# Patient Record
Sex: FEMALE | Race: WHITE | NOT HISPANIC OR LATINO | Employment: UNEMPLOYED | URBAN - METROPOLITAN AREA
[De-identification: names, ages, dates, MRNs, and addresses within clinical notes are randomized per-mention and may not be internally consistent; named-entity substitution may affect disease eponyms.]

---

## 2017-12-14 ENCOUNTER — HISTORICAL (OUTPATIENT)
Dept: RADIOLOGY | Facility: HOSPITAL | Age: 42
End: 2017-12-14

## 2019-02-12 ENCOUNTER — HISTORICAL (OUTPATIENT)
Dept: RADIOLOGY | Facility: HOSPITAL | Age: 44
End: 2019-02-12

## 2021-04-07 ENCOUNTER — HISTORICAL (OUTPATIENT)
Dept: RADIOLOGY | Facility: HOSPITAL | Age: 46
End: 2021-04-07

## 2021-05-25 ENCOUNTER — HISTORICAL (OUTPATIENT)
Dept: RADIOLOGY | Facility: HOSPITAL | Age: 46
End: 2021-05-25

## 2021-05-26 LAB
ABS NEUT (OLG): 6.92 X10(3)/MCL (ref 2.1–9.2)
B-HCG SERPL QL: NEGATIVE
BASOPHILS # BLD AUTO: 0 X10(3)/MCL (ref 0–0.2)
BASOPHILS NFR BLD AUTO: 0 %
BUN SERPL-MCNC: 10.7 MG/DL (ref 7–18.7)
CALCIUM SERPL-MCNC: 10.3 MG/DL (ref 8.4–10.2)
CHLORIDE SERPL-SCNC: 104 MMOL/L (ref 98–107)
CO2 SERPL-SCNC: 23 MMOL/L (ref 22–29)
CREAT SERPL-MCNC: 0.71 MG/DL (ref 0.55–1.02)
CREAT/UREA NIT SERPL: 15
EOSINOPHIL # BLD AUTO: 0.2 X10(3)/MCL (ref 0–0.9)
EOSINOPHIL NFR BLD AUTO: 2 %
ERYTHROCYTE [DISTWIDTH] IN BLOOD BY AUTOMATED COUNT: 12.3 % (ref 11.5–17)
GLUCOSE SERPL-MCNC: 88 MG/DL (ref 74–100)
HCT VFR BLD AUTO: 43.4 % (ref 37–47)
HGB BLD-MCNC: 14.7 GM/DL (ref 12–16)
IMM GRANULOCYTES # BLD AUTO: 0.02 % (ref 0–0.02)
IMM GRANULOCYTES NFR BLD AUTO: 0.2 % (ref 0–0.43)
LYMPHOCYTES # BLD AUTO: 2.9 X10(3)/MCL (ref 0.6–4.6)
LYMPHOCYTES NFR BLD AUTO: 28 %
MCH RBC QN AUTO: 31 PG (ref 27–31)
MCHC RBC AUTO-ENTMCNC: 33.9 GM/DL (ref 33–36)
MCV RBC AUTO: 91.6 FL (ref 80–94)
MONOCYTES # BLD AUTO: 0.6 X10(3)/MCL (ref 0.1–1.3)
MONOCYTES NFR BLD AUTO: 5 %
NEUTROPHILS # BLD AUTO: 6.92 X10(3)/MCL (ref 1.4–7.9)
NEUTROPHILS NFR BLD AUTO: 65 %
PLATELET # BLD AUTO: 386 X10(3)/MCL (ref 130–400)
PMV BLD AUTO: 9.3 FL (ref 9.4–12.4)
POTASSIUM SERPL-SCNC: 4.5 MMOL/L (ref 3.5–5.1)
RBC # BLD AUTO: 4.74 X10(6)/MCL (ref 4.2–5.4)
SARS-COV-2 AG RESP QL IA.RAPID: NEGATIVE
SODIUM SERPL-SCNC: 139 MMOL/L (ref 136–145)
WBC # SPEC AUTO: 10.6 X10(3)/MCL (ref 4.5–11.5)

## 2021-05-28 ENCOUNTER — HISTORICAL (OUTPATIENT)
Dept: SURGERY | Facility: HOSPITAL | Age: 46
End: 2021-05-28

## 2021-09-03 LAB
ABS NEUT (OLG): 4.88 X10(3)/MCL (ref 2.1–9.2)
BASOPHILS # BLD AUTO: 0.05 X10(3)/MCL (ref 0–0.2)
BASOPHILS NFR BLD AUTO: 0.6 % (ref 0–1)
BUN SERPL-MCNC: 12.2 MG/DL (ref 7–18.7)
CALCIUM SERPL-MCNC: 10.9 MG/DL (ref 8.4–10.2)
CHLORIDE SERPL-SCNC: 106 MMOL/L (ref 98–107)
CO2 SERPL-SCNC: 27 MMOL/L (ref 22–29)
CREAT SERPL-MCNC: 0.85 MG/DL (ref 0.57–1.11)
CREAT/UREA NIT SERPL: 14
EOSINOPHIL # BLD AUTO: 0.15 X10(3)/MCL (ref 0–0.9)
EOSINOPHIL NFR BLD AUTO: 1.8 % (ref 0–6.4)
ERYTHROCYTE [DISTWIDTH] IN BLOOD BY AUTOMATED COUNT: 12.4 % (ref 11.5–17)
GLUCOSE SERPL-MCNC: 96 MG/DL (ref 74–100)
HCT VFR BLD AUTO: 42.4 % (ref 37–47)
HGB BLD-MCNC: 14.2 GM/DL (ref 12–16)
IMM GRANULOCYTES # BLD AUTO: 0.03 10*3/UL (ref 0–0.02)
IMM GRANULOCYTES NFR BLD AUTO: 0.4 % (ref 0–0.43)
LYMPHOCYTES # BLD AUTO: 2.54 X10(3)/MCL (ref 0.6–4.6)
LYMPHOCYTES NFR BLD AUTO: 31.1 % (ref 16–44)
MCH RBC QN AUTO: 31.1 PG (ref 27–31)
MCHC RBC AUTO-ENTMCNC: 33.5 GM/DL (ref 33–36)
MCV RBC AUTO: 93 FL (ref 80–94)
MONOCYTES # BLD AUTO: 0.51 X10(3)/MCL (ref 0.1–1.3)
MONOCYTES NFR BLD AUTO: 6.3 % (ref 4–12.1)
NEUTROPHILS # BLD AUTO: 4.88 X10(3)/MCL (ref 2.1–9.2)
NEUTROPHILS NFR BLD AUTO: 59.8 % (ref 43–73)
NRBC BLD AUTO-RTO: 0 % (ref 0–0.2)
PLATELET # BLD AUTO: 374 X10(3)/MCL (ref 130–400)
PMV BLD AUTO: 9 FL (ref 7.4–10.4)
POTASSIUM SERPL-SCNC: 4.6 MMOL/L (ref 3.5–5.1)
RBC # BLD AUTO: 4.56 X10(6)/MCL (ref 4.2–5.4)
SARS-COV-2 AG RESP QL IA.RAPID: NEGATIVE
SODIUM SERPL-SCNC: 142 MMOL/L (ref 136–145)
WBC # SPEC AUTO: 8.2 X10(3)/MCL (ref 4.5–11.5)

## 2021-09-07 ENCOUNTER — HISTORICAL (OUTPATIENT)
Dept: SURGERY | Facility: HOSPITAL | Age: 46
End: 2021-09-07

## 2022-02-22 LAB
PAP RECOMMENDATION EXT: NORMAL
PAP SMEAR: NORMAL

## 2022-03-23 ENCOUNTER — HISTORICAL (OUTPATIENT)
Dept: LAB | Facility: HOSPITAL | Age: 47
End: 2022-03-23

## 2022-03-23 LAB
FSH SERPL-ACNC: 5.71 M[IU]/ML
TESTOST SERPL-MCNC: 34.66 NG/DL (ref 13.84–53.35)
TSH SERPL-ACNC: 1.5 M[IU]/L (ref 0.35–4.94)

## 2022-03-28 ENCOUNTER — HISTORICAL (OUTPATIENT)
Dept: ADMINISTRATIVE | Facility: HOSPITAL | Age: 47
End: 2022-03-28

## 2022-03-28 ENCOUNTER — HISTORICAL (OUTPATIENT)
Dept: RADIOLOGY | Facility: HOSPITAL | Age: 47
End: 2022-03-28

## 2022-04-10 ENCOUNTER — HISTORICAL (OUTPATIENT)
Dept: ADMINISTRATIVE | Facility: HOSPITAL | Age: 47
End: 2022-04-10
Payer: COMMERCIAL

## 2022-04-24 VITALS
DIASTOLIC BLOOD PRESSURE: 79 MMHG | HEIGHT: 66 IN | WEIGHT: 215 LBS | BODY MASS INDEX: 34.55 KG/M2 | SYSTOLIC BLOOD PRESSURE: 139 MMHG

## 2022-04-30 NOTE — OP NOTE
DATE OF SURGERY:    05/28/2021    SURGEON:  Jace Esquivel MD    SERVICE:  Orthopedics.    PREOPERATIVE DIAGNOSES:    1. Left knee partial medial collateral ligament tear.   2. Osteochondral defect with loose cartilage.   3. Bucket handle tear, medial meniscus.    4. Left knee partial anterior cruciate ligament tear.    POSTOPERATIVE DIAGNOSES:    1. Left knee partial medial collateral ligament tear.   2. Osteochondral defect with loose cartilage.   3. Bucket handle tear, medial meniscus.    4. Left knee partial anterior cruciate ligament tear.    OPERATIVE PROCEDURES:    1. Left knee arthroscopy with medial meniscectomy, bucket handle tear of medial meniscus.  2. Debridement of unstable cartilage tear, osteochondral defect, medial femoral condyle.  3. Microfracture, 2 x 2 cm osteochondral defect, medial femoral condyle.  4. Exam under anesthesia and partial debridement of complete anterior cruciate ligament tear.    ANESTHESIA:  LMA.    IV FLUIDS:  Per Anesthesia.    ESTIMATED BLOOD LOSS:  Less than 10 cc.    COUNTS:  Correct.    COMPLICATIONS:  None.    CONDITION:  Stable to PACU.    INDICATION FOR PROCEDURE:  Ms. Mahoney is a 45-year-old female, who had a slip about her left knee.  She had immediate pain, swelling, and instability.  She was seen at an outside clinic, as well as my office.  She had an MRI demonstrating the above findings.  The risks, benefits, alternatives were discussed with the patient in detail.  All questions were answered.  Informed consent was obtained.    PROCEDURE IN DETAIL:  The patient was found in preoperative holding by Anesthesia and found fit for surgery.  She was taken to the operating room and placed on the operating table in supine position.  All bony prominences were well padded.  Time-out was called to identify correct patient, correct procedure, correct side, and all were in agreement.  The patient underwent LMA anesthesia without complications.  She was then prepped and  draped in the normal sterile fashion leaving the left lower extremity exposed for surgery.  A well-padded tourniquet was placed on the left upper thigh.  Approximate tourniquet time was 30 minutes at 300.  She received her preoperative antibiotics.  After exsanguination of the left lower extremity, tourniquet was inflated.  Anterolateral portal was made with 1 cm incision.  Camera was introduced in suprapatellar pouch.  Next, she had some small loose bodies of the suprapatellar pouch.  The medial and lateral gutters were inspected, no loose bodies.  Camera was introduced into the medial compartment which had a complete bucket-handle tear of the medial meniscus.  It was torn both medially and longitudinally and also had a large amount of fraying.  The patient's meniscus tear was not amenable to repair.  Anteromedial portal was then made through a 1 cm incision.  Next, the meniscus was reduced and the meniscal tear was removed with straight biter and 3.5 shaver.  The remaining remnant of the medial meniscus was then probed and found to be stable without any signs of additional tear or instability.  She also had a large unstable medial femoral condyle cartilage tear.  There was exposed bone.  With use of 3.5 shaver, the patient underwent a debridement of the more loose cartilage around this lesion.  After debriding the unstable cartilage with use of a K-wire, the patient had a microfracture of this lesion.  After this was done, camera was introduced into the intercondylar notch where patient appeared to be an initially to have a one bundle ACL tear.  The ACL tear, which was detached, was debrided more anteriorly.  Next, probing of the lateral wall demonstrating a suspected full-thickness tear of the entire ACL.  PCL was intact.  Camera introduced into the lateral compartment where the lateral meniscus was probed and found to be stable without any signs of tear or instability.  She had no obvious cartilage changes of  the lateral compartment.  Camera was introduced back into the suprapatellar pouch where the loose bodies were removed with a 3.5 shaver.  Tourniquet was released.  Hemostasis was achieved.  Copious irrigation was used to wash the two incisions.  The incisions were closed with 2-0 nylon sutures in standard horizontal mattress configuration.  Xeroform, 4 x 4's, soft tissue dressing, Ace wrap and a knee immobilizer were placed on the left lower extremity.  She was awoken by Anesthesia and brought to PACU in stable condition.      ______________________________  MD DICK Desai/HARDEEP  DD:  05/28/2021  Time:  10:10AM  DT:  05/28/2021  Time:  10:39AM  Job #:  [id number]

## 2022-06-08 ENCOUNTER — OFFICE VISIT (OUTPATIENT)
Dept: ORTHOPEDICS | Facility: CLINIC | Age: 47
End: 2022-06-08
Payer: COMMERCIAL

## 2022-06-08 VITALS — HEIGHT: 65 IN | BODY MASS INDEX: 34.99 KG/M2 | WEIGHT: 210 LBS

## 2022-06-08 DIAGNOSIS — S83.512D TEARS OF MENISCUS AND ACL OF LEFT KNEE, SUBSEQUENT ENCOUNTER: Primary | ICD-10-CM

## 2022-06-08 DIAGNOSIS — S83.207D TEARS OF MENISCUS AND ACL OF LEFT KNEE, SUBSEQUENT ENCOUNTER: Primary | ICD-10-CM

## 2022-06-08 PROCEDURE — 1159F MED LIST DOCD IN RCRD: CPT | Mod: CPTII,,,

## 2022-06-08 PROCEDURE — 3008F PR BODY MASS INDEX (BMI) DOCUMENTED: ICD-10-PCS | Mod: CPTII,,,

## 2022-06-08 PROCEDURE — 1160F PR REVIEW ALL MEDS BY PRESCRIBER/CLIN PHARMACIST DOCUMENTED: ICD-10-PCS | Mod: CPTII,,,

## 2022-06-08 PROCEDURE — 3008F BODY MASS INDEX DOCD: CPT | Mod: CPTII,,,

## 2022-06-08 PROCEDURE — 1159F PR MEDICATION LIST DOCUMENTED IN MEDICAL RECORD: ICD-10-PCS | Mod: CPTII,,,

## 2022-06-08 PROCEDURE — 1160F RVW MEDS BY RX/DR IN RCRD: CPT | Mod: CPTII,,,

## 2022-06-08 PROCEDURE — 99213 PR OFFICE/OUTPT VISIT, EST, LEVL III, 20-29 MIN: ICD-10-PCS | Mod: ,,,

## 2022-06-08 PROCEDURE — 99213 OFFICE O/P EST LOW 20 MIN: CPT | Mod: ,,,

## 2022-06-08 RX ORDER — LEVONORGESTREL AND ETHINYL ESTRADIOL 0.15-0.03
1 KIT ORAL DAILY
COMMUNITY
Start: 2022-05-18

## 2023-02-02 ENCOUNTER — DOCUMENTATION ONLY (OUTPATIENT)
Dept: ADMINISTRATIVE | Facility: HOSPITAL | Age: 48
End: 2023-02-02
Payer: COMMERCIAL

## 2023-04-11 ENCOUNTER — HOSPITAL ENCOUNTER (EMERGENCY)
Facility: HOSPITAL | Age: 48
Discharge: HOME | End: 2023-04-11
Attending: EMERGENCY MEDICINE
Payer: COMMERCIAL

## 2023-04-11 VITALS
SYSTOLIC BLOOD PRESSURE: 156 MMHG | DIASTOLIC BLOOD PRESSURE: 95 MMHG | HEART RATE: 88 BPM | BODY MASS INDEX: 34.55 KG/M2 | OXYGEN SATURATION: 99 % | HEIGHT: 66 IN | TEMPERATURE: 98 F | RESPIRATION RATE: 18 BRPM | WEIGHT: 215 LBS

## 2023-04-11 DIAGNOSIS — R10.10 UPPER ABDOMINAL PAIN: ICD-10-CM

## 2023-04-11 DIAGNOSIS — R10.13 EPIGASTRIC PAIN: ICD-10-CM

## 2023-04-11 LAB
ALBUMIN SERPL-MCNC: 3.8 G/DL (ref 3.5–5)
ALBUMIN/GLOB SERPL: 1 RATIO (ref 1.1–2)
ALP SERPL-CCNC: 72 UNIT/L (ref 40–150)
ALT SERPL-CCNC: 15 UNIT/L (ref 0–55)
APPEARANCE UR: CLEAR
AST SERPL-CCNC: 23 UNIT/L (ref 5–34)
BACTERIA #/AREA URNS AUTO: NORMAL /HPF
BASOPHILS # BLD AUTO: 0.06 X10(3)/MCL (ref 0–0.2)
BASOPHILS NFR BLD AUTO: 0.6 %
BILIRUB UR QL STRIP.AUTO: NEGATIVE MG/DL
BILIRUBIN DIRECT+TOT PNL SERPL-MCNC: <0.5 MG/DL
BUN SERPL-MCNC: 11.9 MG/DL (ref 7–18.7)
CALCIUM SERPL-MCNC: 10.7 MG/DL (ref 8.4–10.2)
CHLORIDE SERPL-SCNC: 108 MMOL/L (ref 98–107)
CO2 SERPL-SCNC: 20 MMOL/L (ref 22–29)
COLOR UR AUTO: ABNORMAL
CREAT SERPL-MCNC: 0.85 MG/DL (ref 0.55–1.02)
EOSINOPHIL # BLD AUTO: 0.12 X10(3)/MCL (ref 0–0.9)
EOSINOPHIL NFR BLD AUTO: 1.1 %
ERYTHROCYTE [DISTWIDTH] IN BLOOD BY AUTOMATED COUNT: 12.3 % (ref 11.5–17)
GFR SERPLBLD CREATININE-BSD FMLA CKD-EPI: >60 MLS/MIN/1.73/M2
GLOBULIN SER-MCNC: 3.7 GM/DL (ref 2.4–3.5)
GLUCOSE SERPL-MCNC: 91 MG/DL (ref 74–100)
GLUCOSE UR QL STRIP.AUTO: NEGATIVE MG/DL
HCT VFR BLD AUTO: 42.8 % (ref 37–47)
HGB BLD-MCNC: 14.8 G/DL (ref 12–16)
IMM GRANULOCYTES # BLD AUTO: 0.04 X10(3)/MCL (ref 0–0.04)
IMM GRANULOCYTES NFR BLD AUTO: 0.4 %
KETONES UR QL STRIP.AUTO: NEGATIVE MG/DL
LEUKOCYTE ESTERASE UR QL STRIP.AUTO: NEGATIVE UNIT/L
LIPASE SERPL-CCNC: 17 U/L
LYMPHOCYTES # BLD AUTO: 2.44 X10(3)/MCL (ref 0.6–4.6)
LYMPHOCYTES NFR BLD AUTO: 22.8 %
MCH RBC QN AUTO: 31.6 PG (ref 27–31)
MCHC RBC AUTO-ENTMCNC: 34.6 G/DL (ref 33–36)
MCV RBC AUTO: 91.3 FL (ref 80–94)
MONOCYTES # BLD AUTO: 0.48 X10(3)/MCL (ref 0.1–1.3)
MONOCYTES NFR BLD AUTO: 4.5 %
NEUTROPHILS # BLD AUTO: 7.57 X10(3)/MCL (ref 2.1–9.2)
NEUTROPHILS NFR BLD AUTO: 70.6 %
NITRITE UR QL STRIP.AUTO: NEGATIVE
NRBC BLD AUTO-RTO: 0 %
PH UR STRIP.AUTO: 6.5 [PH]
PLATELET # BLD AUTO: 368 X10(3)/MCL (ref 130–400)
PMV BLD AUTO: 9.3 FL (ref 7.4–10.4)
POTASSIUM SERPL-SCNC: 4.1 MMOL/L (ref 3.5–5.1)
PROT SERPL-MCNC: 7.5 GM/DL (ref 6.4–8.3)
PROT UR QL STRIP.AUTO: NEGATIVE MG/DL
RBC # BLD AUTO: 4.69 X10(6)/MCL (ref 4.2–5.4)
RBC #/AREA URNS AUTO: NORMAL /HPF
RBC UR QL AUTO: ABNORMAL UNIT/L
SODIUM SERPL-SCNC: 138 MMOL/L (ref 136–145)
SP GR UR STRIP.AUTO: <=1.005
SQUAMOUS #/AREA URNS AUTO: NORMAL /HPF
TROPONIN I SERPL-MCNC: <0.01 NG/ML (ref 0–0.04)
UROBILINOGEN UR STRIP-ACNC: 0.2 MG/DL
WBC # SPEC AUTO: 10.7 X10(3)/MCL (ref 4.5–11.5)
WBC #/AREA URNS AUTO: NORMAL /HPF

## 2023-04-11 PROCEDURE — 96374 THER/PROPH/DIAG INJ IV PUSH: CPT

## 2023-04-11 PROCEDURE — 81001 URINALYSIS AUTO W/SCOPE: CPT | Performed by: NURSE PRACTITIONER

## 2023-04-11 PROCEDURE — 93010 ELECTROCARDIOGRAM REPORT: CPT | Mod: ,,, | Performed by: INTERNAL MEDICINE

## 2023-04-11 PROCEDURE — 83690 ASSAY OF LIPASE: CPT | Performed by: NURSE PRACTITIONER

## 2023-04-11 PROCEDURE — 80053 COMPREHEN METABOLIC PANEL: CPT | Performed by: NURSE PRACTITIONER

## 2023-04-11 PROCEDURE — 93005 ELECTROCARDIOGRAM TRACING: CPT

## 2023-04-11 PROCEDURE — 25000003 PHARM REV CODE 250: Performed by: NURSE PRACTITIONER

## 2023-04-11 PROCEDURE — 85025 COMPLETE CBC W/AUTO DIFF WBC: CPT | Performed by: NURSE PRACTITIONER

## 2023-04-11 PROCEDURE — 25500020 PHARM REV CODE 255: Performed by: NURSE PRACTITIONER

## 2023-04-11 PROCEDURE — 99285 EMERGENCY DEPT VISIT HI MDM: CPT | Mod: 25

## 2023-04-11 PROCEDURE — 93010 EKG 12-LEAD: ICD-10-PCS | Mod: ,,, | Performed by: INTERNAL MEDICINE

## 2023-04-11 PROCEDURE — 63600175 PHARM REV CODE 636 W HCPCS: Performed by: NURSE PRACTITIONER

## 2023-04-11 PROCEDURE — C9113 INJ PANTOPRAZOLE SODIUM, VIA: HCPCS | Performed by: NURSE PRACTITIONER

## 2023-04-11 PROCEDURE — 84484 ASSAY OF TROPONIN QUANT: CPT | Performed by: NURSE PRACTITIONER

## 2023-04-11 RX ORDER — PANTOPRAZOLE SODIUM 40 MG/1
40 TABLET, DELAYED RELEASE ORAL DAILY
Qty: 30 TABLET | Refills: 11 | Status: SHIPPED | OUTPATIENT
Start: 2023-04-11 | End: 2024-04-10

## 2023-04-11 RX ORDER — SUCRALFATE 1 G/1
1 TABLET ORAL
Qty: 28 TABLET | Refills: 0 | Status: SHIPPED | OUTPATIENT
Start: 2023-04-11 | End: 2023-04-18

## 2023-04-11 RX ORDER — LIDOCAINE HYDROCHLORIDE 20 MG/ML
15 SOLUTION OROPHARYNGEAL ONCE
Status: COMPLETED | OUTPATIENT
Start: 2023-04-11 | End: 2023-04-11

## 2023-04-11 RX ORDER — PANTOPRAZOLE SODIUM 40 MG/10ML
40 INJECTION, POWDER, LYOPHILIZED, FOR SOLUTION INTRAVENOUS
Status: COMPLETED | OUTPATIENT
Start: 2023-04-11 | End: 2023-04-11

## 2023-04-11 RX ORDER — MAG HYDROX/ALUMINUM HYD/SIMETH 200-200-20
30 SUSPENSION, ORAL (FINAL DOSE FORM) ORAL ONCE
Status: COMPLETED | OUTPATIENT
Start: 2023-04-11 | End: 2023-04-11

## 2023-04-11 RX ADMIN — IOPAMIDOL 100 ML: 755 INJECTION, SOLUTION INTRAVENOUS at 01:04

## 2023-04-11 RX ADMIN — PANTOPRAZOLE SODIUM 40 MG: 40 INJECTION, POWDER, LYOPHILIZED, FOR SOLUTION INTRAVENOUS at 01:04

## 2023-04-11 RX ADMIN — LIDOCAINE HYDROCHLORIDE 15 ML: 20 SOLUTION ORAL; TOPICAL at 02:04

## 2023-04-11 RX ADMIN — ALUMINUM HYDROXIDE, MAGNESIUM HYDROXIDE, AND SIMETHICONE 30 ML: 200; 200; 20 SUSPENSION ORAL at 02:04

## 2023-04-11 NOTE — ED PROVIDER NOTES
"Encounter Date: 4/11/2023       History     Chief Complaint   Patient presents with    Abdominal Pain     Pt complaint of upper abd pain over the few weeks with persistent tenderness and pressure.      48 y/o female presents with epigastric pain that has been pretty consistent for the past month but this AM it was much more intense than it has ever been. States burning sensation/fire sensation. Some nausea. No vomiting. She states that at some point someone told her she had a hiatal hernia however she has never had any imaging done to verify this. She was supposed to have EGD and colonoscopy done 2 years ago but then injured her knee so that was postponed. She states that she hasn't had any fever. No cough/congestion. She does have "stomach" issues. No sob.     The history is provided by the patient. No  was used.     The other symptoms of the illness include nausea.   Review of patient's allergies indicates:  No Known Allergies  No past medical history on file.  No past surgical history on file.  No family history on file.  Social History     Tobacco Use    Smoking status: Never    Smokeless tobacco: Never     Review of Systems   Gastrointestinal:  Positive for abdominal pain and nausea.   All other systems reviewed and are negative.    Physical Exam     Initial Vitals [04/11/23 1135]   BP Pulse Resp Temp SpO2   127/82 92 18 98.6 °F (37 °C) 100 %      MAP       --         Physical Exam    Nursing note and vitals reviewed.  Constitutional: She appears well-developed and well-nourished.   Eyes: Conjunctivae are normal.   Cardiovascular:  Normal rate, regular rhythm and normal heart sounds.           Pulmonary/Chest: Breath sounds normal. No respiratory distress.   Abdominal: Abdomen is soft. Bowel sounds are normal. She exhibits no distension. abdominal tenderness (epigastric)   obese     Neurological: She is alert and oriented to person, place, and time. She has normal strength.   Skin: Skin is " warm and dry.   Psychiatric: She has a normal mood and affect.       ED Course   Procedures  Labs Reviewed   CBC WITH DIFFERENTIAL - Abnormal; Notable for the following components:       Result Value    MCH 31.6 (*)     All other components within normal limits   COMPREHENSIVE METABOLIC PANEL - Abnormal; Notable for the following components:    Chloride 108 (*)     Carbon Dioxide 20 (*)     Calcium Level Total 10.7 (*)     Globulin 3.7 (*)     Albumin/Globulin Ratio 1.0 (*)     All other components within normal limits   URINALYSIS - Abnormal; Notable for the following components:    Blood, UA Small (*)     All other components within normal limits   LIPASE - Normal   TROPONIN I - Normal   URINALYSIS, MICROSCOPIC - Normal     EKG Readings: (Independently Interpreted)   Initial Reading: No STEMI. Rhythm: Normal Sinus Rhythm. Heart Rate: 83. Ectopy: No Ectopy. Conduction: Normal. ST Segments: Normal ST Segments. T Waves: Normal. Clinical Impression: Normal Sinus Rhythm   ECG Results              EKG 12-lead (In process)  Result time 04/11/23 12:47:12      In process by Interface, Lab In [facility] (04/11/23 12:47:12)                   Narrative:    Test Reason : R10.10,    Vent. Rate : 083 BPM     Atrial Rate : 083 BPM     P-R Int : 142 ms          QRS Dur : 080 ms      QT Int : 356 ms       P-R-T Axes : 058 078 057 degrees     QTc Int : 418 ms    Normal sinus rhythm  Normal ECG  No previous ECGs available    Referred By: [email or web address]   SELF           Confirmed By:                                   Imaging Results              X-Ray Chest PA And Lateral (Final result)  Result time 04/11/23 13:57:08      Final result by Ignacia Oneil MD (04/11/23 13:57:08)                   Impression:      No acute abnormality of the chest.      Electronically signed by: Ignacia Oneil  Date:    04/11/2023  Time:    13:57               Narrative:    EXAMINATION:  XR CHEST PA AND LATERAL    CLINICAL HISTORY:  Epigastric  pain    TECHNIQUE:  PA and lateral chest radiographs    COMPARISON:  Chest x-ray dated 12/21/2016    FINDINGS:  The heart is normal in size.  The lungs are clear.  There is no pleural effusion or visible pneumothorax.  There are mild degenerative changes of the thoracic spine.                                       CT Abdomen Pelvis With Contrast (Final result)  Result time 04/11/23 13:36:21      Final result by Abdi Vazquez MD (04/11/23 13:36:21)                   Impression:      No abnormality seen      Electronically signed by: Abdi Vazquez  Date:    04/11/2023  Time:    13:36               Narrative:    EXAMINATION:  CT ABDOMEN PELVIS WITH CONTRAST    CLINICAL HISTORY:  Epigastric pain;    TECHNIQUE:  Low dose axial images, sagittal and coronal reformations were obtained from the lung bases to the pubic symphysis following the IV administration of contrast. Automatic exposure control (AEC) is utilized to reduce patient radiation exposure.    COMPARISON:  None.    FINDINGS:  The lung bases are clear.    The liver appears normal.  No liver mass or lesion is seen.  Portal and hepatic veins appear normal.    The gallbladder appears normal.  No obvious gallstones are seen.  No biliary dilatation is seen.  No pericholecystic fluid is seen.    The pancreas appears normal.  No pancreatic mass or lesion is seen.    The spleen shows no acute abnormality.    The adrenal glands appear normal.  No adrenal nodule is seen.    The kidneys appear normal.  No hydronephrosis is seen.  No hydroureter is seen.  No nephrolithiasis is seen.  No obvious ureteral stones are seen.    Urinary bladder appears grossly unremarkable.    No colitis is seen.  No diverticulitis is seen.  No obvious colonic mass or lesion is seen.    No free air is seen.  No free fluid is seen.                                       Medications   pantoprazole injection 40 mg (40 mg Intravenous Given 4/11/23 1302)   iopamidoL (ISOVUE-370) injection  100 mL (100 mLs Intravenous Given 4/11/23 1316)   aluminum-magnesium hydroxide-simethicone 200-200-20 mg/5 mL suspension 30 mL (30 mLs Oral Given 4/11/23 1403)     And   LIDOcaine HCl 2% oral solution 15 mL (15 mLs Oral Given 4/11/23 1403)     Medical Decision Making:   Differential Diagnosis:   Includes but not limited to gastritis, PUD, esophagitis, stemi, nstemi, pneumonia, cholecystitis, pancreatitis  Independently Interpreted Test(s):   I have ordered and independently interpreted X-rays - see summary below.  Clinical Tests:   Lab Tests: Ordered and Reviewed  The following lab test(s) were unremarkable: CBC, CMP, Urinalysis, UPT, Lipase and Troponin       <> Summary of Lab: No acute findings. No leukocytosis, neg trop. Normal lipase. CMP unremarkable for acute findings. Upt neg. UA has occult blood +1  Radiological Study: Ordered and Reviewed  Medical Tests: Ordered and Reviewed  ED Management:  48 yo female with a month of more persistent epigastric abdominal area pain with nausea that intensified this AM and was burning type sensation and almost into her throat as well. +nausea. No vomiting. States that it was the worse its been and she became concerned. She denies fever. States she has found that after she has had some alcohol the burning worsens. She hasn't really tried any medications for it other than taking her 's protonix every now and then. She is supposed to f/u with GI (was 2 years ago and something else happened). Labs unremarkable for acute finding with neg trop, ekg no acute findings. Xray chest and CT abd/pelvis no acute findings. Protonix IV given here along with GI cocktail. She is not toxic in appearance and vitals appear stable. Discussed will treat as PUD or esophagitis with carafate and protonix. She is going to call GI to schedule outpatient appt for scopres.                         Clinical Impression:   Final diagnoses:  [R10.10] Upper abdominal pain  [R10.13] Epigastric pain         ED Disposition Condition    Discharge Stable          ED Prescriptions       Medication Sig Dispense Start Date End Date Auth. Provider    sucralfate (CARAFATE) 1 gram tablet Take 1 tablet (1 g total) by mouth 4 (four) times daily before meals and nightly. for 7 days 28 tablet 4/11/2023 4/18/2023 FARHANA Medina    pantoprazole (PROTONIX) 40 MG tablet Take 1 tablet (40 mg total) by mouth once daily. 30 tablet 4/11/2023 4/10/2024 FARHANA Medina          Follow-up Information       Follow up With Specialties Details Why Contact Info    Emery Galvez MD Gastroenterology Call in 3 days for follow up [address] [facility] [id number] [phone]               FARHANA Medina  04/11/23 0433

## 2023-04-11 NOTE — ED TRIAGE NOTES
Pt complaint of upper abd pain over the few weeks with persistent tenderness and pressure relating nausea

## 2023-04-11 NOTE — DISCHARGE INSTRUCTIONS
Carafate before meals and before bed for 7 days. Protonix daily. Watch the acidic foods.     Follow up with gastroenterology.

## 2023-06-06 ENCOUNTER — LAB VISIT (OUTPATIENT)
Dept: LAB | Facility: HOSPITAL | Age: 48
End: 2023-06-06
Attending: OBSTETRICS & GYNECOLOGY
Payer: COMMERCIAL

## 2023-06-06 DIAGNOSIS — N92.1 METRORRHAGIA: Primary | ICD-10-CM

## 2023-06-06 LAB
BASOPHILS # BLD AUTO: 0.04 X10(3)/MCL
BASOPHILS NFR BLD AUTO: 0.5 %
EOSINOPHIL # BLD AUTO: 0.15 X10(3)/MCL (ref 0–0.9)
EOSINOPHIL NFR BLD AUTO: 1.8 %
ERYTHROCYTE [DISTWIDTH] IN BLOOD BY AUTOMATED COUNT: 13.2 % (ref 11.5–17)
HCT VFR BLD AUTO: 39.7 % (ref 37–47)
HGB BLD-MCNC: 13.2 G/DL (ref 12–16)
IMM GRANULOCYTES # BLD AUTO: 0.03 X10(3)/MCL (ref 0–0.04)
IMM GRANULOCYTES NFR BLD AUTO: 0.4 %
LYMPHOCYTES # BLD AUTO: 2.52 X10(3)/MCL (ref 0.6–4.6)
LYMPHOCYTES NFR BLD AUTO: 30.7 %
MCH RBC QN AUTO: 31 PG (ref 27–31)
MCHC RBC AUTO-ENTMCNC: 33.2 G/DL (ref 33–36)
MCV RBC AUTO: 93.2 FL (ref 80–94)
MONOCYTES # BLD AUTO: 0.51 X10(3)/MCL (ref 0.1–1.3)
MONOCYTES NFR BLD AUTO: 6.2 %
NEUTROPHILS # BLD AUTO: 4.95 X10(3)/MCL (ref 2.1–9.2)
NEUTROPHILS NFR BLD AUTO: 60.4 %
NRBC BLD AUTO-RTO: 0 %
PLATELET # BLD AUTO: 337 X10(3)/MCL (ref 130–400)
PMV BLD AUTO: 9.8 FL (ref 7.4–10.4)
RBC # BLD AUTO: 4.26 X10(6)/MCL (ref 4.2–5.4)
WBC # SPEC AUTO: 8.2 X10(3)/MCL (ref 4.5–11.5)

## 2023-06-06 PROCEDURE — 36415 COLL VENOUS BLD VENIPUNCTURE: CPT

## 2023-06-06 PROCEDURE — 85025 COMPLETE CBC W/AUTO DIFF WBC: CPT

## 2023-06-09 ENCOUNTER — ANESTHESIA EVENT (OUTPATIENT)
Dept: SURGERY | Facility: HOSPITAL | Age: 48
End: 2023-06-09
Payer: COMMERCIAL

## 2023-06-14 ENCOUNTER — HOSPITAL ENCOUNTER (OUTPATIENT)
Facility: HOSPITAL | Age: 48
Discharge: HOME | End: 2023-06-15
Attending: OBSTETRICS & GYNECOLOGY | Admitting: OBSTETRICS & GYNECOLOGY
Payer: COMMERCIAL

## 2023-06-14 ENCOUNTER — ANESTHESIA (OUTPATIENT)
Dept: SURGERY | Facility: HOSPITAL | Age: 48
End: 2023-06-14
Payer: COMMERCIAL

## 2023-06-14 DIAGNOSIS — N92.0 MENORRHAGIA: ICD-10-CM

## 2023-06-14 DIAGNOSIS — N92.1 EXCESSIVE, FREQUENT AND IRREGULAR MENSTRUATION: ICD-10-CM

## 2023-06-14 LAB
ABORH RETYPE: NORMAL
B-HCG UR QL: NEGATIVE
CTP QC/QA: YES
GROUP & RH: NORMAL
INDIRECT COOMBS GEL: NORMAL
SPECIMEN OUTDATE: NORMAL

## 2023-06-14 PROCEDURE — 63600175 PHARM REV CODE 636 W HCPCS

## 2023-06-14 PROCEDURE — G0378 HOSPITAL OBSERVATION PER HR: HCPCS

## 2023-06-14 PROCEDURE — 63600175 PHARM REV CODE 636 W HCPCS: Performed by: ANESTHESIOLOGY

## 2023-06-14 PROCEDURE — 37000009 HC ANESTHESIA EA ADD 15 MINS: Performed by: OBSTETRICS & GYNECOLOGY

## 2023-06-14 PROCEDURE — 25000003 PHARM REV CODE 250: Performed by: ANESTHESIOLOGY

## 2023-06-14 PROCEDURE — 86900 BLOOD TYPING SEROLOGIC ABO: CPT | Performed by: OBSTETRICS & GYNECOLOGY

## 2023-06-14 PROCEDURE — 36000712 HC OR TIME LEV V 1ST 15 MIN: Performed by: OBSTETRICS & GYNECOLOGY

## 2023-06-14 PROCEDURE — D9220A PRA ANESTHESIA: Mod: ANES,,, | Performed by: ANESTHESIOLOGY

## 2023-06-14 PROCEDURE — 37000008 HC ANESTHESIA 1ST 15 MINUTES: Performed by: OBSTETRICS & GYNECOLOGY

## 2023-06-14 PROCEDURE — 63600175 PHARM REV CODE 636 W HCPCS: Performed by: OBSTETRICS & GYNECOLOGY

## 2023-06-14 PROCEDURE — D9220A PRA ANESTHESIA: ICD-10-PCS | Mod: CRNA,,, | Performed by: NURSE ANESTHETIST, CERTIFIED REGISTERED

## 2023-06-14 PROCEDURE — 81025 URINE PREGNANCY TEST: CPT | Performed by: OBSTETRICS & GYNECOLOGY

## 2023-06-14 PROCEDURE — 27201423 OPTIME MED/SURG SUP & DEVICES STERILE SUPPLY: Performed by: OBSTETRICS & GYNECOLOGY

## 2023-06-14 PROCEDURE — D9220A PRA ANESTHESIA: Mod: CRNA,,, | Performed by: NURSE ANESTHETIST, CERTIFIED REGISTERED

## 2023-06-14 PROCEDURE — D9220A PRA ANESTHESIA: ICD-10-PCS | Mod: ANES,,, | Performed by: ANESTHESIOLOGY

## 2023-06-14 PROCEDURE — 71000033 HC RECOVERY, INTIAL HOUR: Performed by: OBSTETRICS & GYNECOLOGY

## 2023-06-14 PROCEDURE — 25000003 PHARM REV CODE 250

## 2023-06-14 PROCEDURE — 36000713 HC OR TIME LEV V EA ADD 15 MIN: Performed by: OBSTETRICS & GYNECOLOGY

## 2023-06-14 PROCEDURE — 71000039 HC RECOVERY, EACH ADD'L HOUR: Performed by: OBSTETRICS & GYNECOLOGY

## 2023-06-14 PROCEDURE — 25000003 PHARM REV CODE 250: Performed by: OBSTETRICS & GYNECOLOGY

## 2023-06-14 RX ORDER — LIDOCAINE HYDROCHLORIDE 20 MG/ML
INJECTION, SOLUTION EPIDURAL; INFILTRATION; INTRACAUDAL; PERINEURAL
Status: DISCONTINUED | OUTPATIENT
Start: 2023-06-14 | End: 2023-06-14

## 2023-06-14 RX ORDER — ACETAMINOPHEN 500 MG
1000 TABLET ORAL
Status: COMPLETED | OUTPATIENT
Start: 2023-06-14 | End: 2023-06-14

## 2023-06-14 RX ORDER — GABAPENTIN 300 MG/1
600 CAPSULE ORAL
Status: COMPLETED | OUTPATIENT
Start: 2023-06-14 | End: 2023-06-14

## 2023-06-14 RX ORDER — PROCHLORPERAZINE EDISYLATE 5 MG/ML
5 INJECTION INTRAMUSCULAR; INTRAVENOUS EVERY 6 HOURS PRN
Status: DISCONTINUED | OUTPATIENT
Start: 2023-06-14 | End: 2023-06-15 | Stop reason: HOSPADM

## 2023-06-14 RX ORDER — ONDANSETRON 2 MG/ML
4 INJECTION INTRAMUSCULAR; INTRAVENOUS
Status: COMPLETED | OUTPATIENT
Start: 2023-06-14 | End: 2023-06-14

## 2023-06-14 RX ORDER — FENTANYL CITRATE 50 UG/ML
INJECTION, SOLUTION INTRAMUSCULAR; INTRAVENOUS
Status: DISCONTINUED | OUTPATIENT
Start: 2023-06-14 | End: 2023-06-14

## 2023-06-14 RX ORDER — ROCURONIUM BROMIDE 10 MG/ML
INJECTION, SOLUTION INTRAVENOUS
Status: DISCONTINUED | OUTPATIENT
Start: 2023-06-14 | End: 2023-06-14

## 2023-06-14 RX ORDER — CELECOXIB 200 MG/1
200 CAPSULE ORAL
Status: COMPLETED | OUTPATIENT
Start: 2023-06-14 | End: 2023-06-14

## 2023-06-14 RX ORDER — HYDROMORPHONE HYDROCHLORIDE 2 MG/ML
1 INJECTION, SOLUTION INTRAMUSCULAR; INTRAVENOUS; SUBCUTANEOUS EVERY 4 HOURS PRN
Status: DISCONTINUED | OUTPATIENT
Start: 2023-06-14 | End: 2023-06-15 | Stop reason: HOSPADM

## 2023-06-14 RX ORDER — KETOROLAC TROMETHAMINE 30 MG/ML
INJECTION, SOLUTION INTRAMUSCULAR; INTRAVENOUS
Status: DISCONTINUED | OUTPATIENT
Start: 2023-06-14 | End: 2023-06-14

## 2023-06-14 RX ORDER — OXYCODONE AND ACETAMINOPHEN 5; 325 MG/1; MG/1
1 TABLET ORAL EVERY 4 HOURS PRN
Status: DISCONTINUED | OUTPATIENT
Start: 2023-06-14 | End: 2023-06-15 | Stop reason: HOSPADM

## 2023-06-14 RX ORDER — CEFAZOLIN SODIUM 1 G/3ML
1 INJECTION, POWDER, FOR SOLUTION INTRAMUSCULAR; INTRAVENOUS
Status: DISCONTINUED | OUTPATIENT
Start: 2023-06-14 | End: 2023-06-15 | Stop reason: HOSPADM

## 2023-06-14 RX ORDER — MIDAZOLAM HYDROCHLORIDE 1 MG/ML
INJECTION INTRAMUSCULAR; INTRAVENOUS
Status: DISCONTINUED | OUTPATIENT
Start: 2023-06-14 | End: 2023-06-14

## 2023-06-14 RX ORDER — DEXAMETHASONE SODIUM PHOSPHATE 4 MG/ML
INJECTION, SOLUTION INTRA-ARTICULAR; INTRALESIONAL; INTRAMUSCULAR; INTRAVENOUS; SOFT TISSUE
Status: DISCONTINUED | OUTPATIENT
Start: 2023-06-14 | End: 2023-06-14

## 2023-06-14 RX ORDER — SODIUM CHLORIDE, SODIUM LACTATE, POTASSIUM CHLORIDE, CALCIUM CHLORIDE 600; 310; 30; 20 MG/100ML; MG/100ML; MG/100ML; MG/100ML
INJECTION, SOLUTION INTRAVENOUS CONTINUOUS
Status: DISCONTINUED | OUTPATIENT
Start: 2023-06-14 | End: 2023-06-15 | Stop reason: HOSPADM

## 2023-06-14 RX ORDER — SIMETHICONE 80 MG
80 TABLET,CHEWABLE ORAL EVERY 4 HOURS PRN
Status: DISCONTINUED | OUTPATIENT
Start: 2023-06-14 | End: 2023-06-15 | Stop reason: HOSPADM

## 2023-06-14 RX ORDER — DIPHENHYDRAMINE HCL 25 MG
25 CAPSULE ORAL EVERY 4 HOURS PRN
Status: DISCONTINUED | OUTPATIENT
Start: 2023-06-14 | End: 2023-06-15 | Stop reason: HOSPADM

## 2023-06-14 RX ORDER — PHENYLEPHRINE HCL IN 0.9% NACL 1 MG/10 ML
SYRINGE (ML) INTRAVENOUS
Status: DISCONTINUED | OUTPATIENT
Start: 2023-06-14 | End: 2023-06-14

## 2023-06-14 RX ORDER — HYDROMORPHONE HYDROCHLORIDE 2 MG/ML
0.4 INJECTION, SOLUTION INTRAMUSCULAR; INTRAVENOUS; SUBCUTANEOUS EVERY 5 MIN PRN
Status: DISCONTINUED | OUTPATIENT
Start: 2023-06-14 | End: 2023-06-14

## 2023-06-14 RX ORDER — IBUPROFEN 600 MG/1
600 TABLET ORAL EVERY 6 HOURS PRN
Status: DISCONTINUED | OUTPATIENT
Start: 2023-06-14 | End: 2023-06-15 | Stop reason: HOSPADM

## 2023-06-14 RX ORDER — EPHEDRINE SULFATE 50 MG/ML
INJECTION, SOLUTION INTRAVENOUS
Status: DISCONTINUED | OUTPATIENT
Start: 2023-06-14 | End: 2023-06-14

## 2023-06-14 RX ORDER — ONDANSETRON 4 MG/1
8 TABLET, ORALLY DISINTEGRATING ORAL EVERY 8 HOURS PRN
Status: DISCONTINUED | OUTPATIENT
Start: 2023-06-14 | End: 2023-06-15 | Stop reason: HOSPADM

## 2023-06-14 RX ORDER — PROPOFOL 10 MG/ML
VIAL (ML) INTRAVENOUS
Status: DISCONTINUED | OUTPATIENT
Start: 2023-06-14 | End: 2023-06-14

## 2023-06-14 RX ORDER — HYDROMORPHONE HYDROCHLORIDE 2 MG/ML
INJECTION, SOLUTION INTRAMUSCULAR; INTRAVENOUS; SUBCUTANEOUS
Status: DISCONTINUED | OUTPATIENT
Start: 2023-06-14 | End: 2023-06-14

## 2023-06-14 RX ORDER — ONDANSETRON 2 MG/ML
4 INJECTION INTRAMUSCULAR; INTRAVENOUS ONCE AS NEEDED
Status: DISCONTINUED | OUTPATIENT
Start: 2023-06-14 | End: 2023-06-14

## 2023-06-14 RX ORDER — HYDROMORPHONE HYDROCHLORIDE 2 MG/ML
1 INJECTION, SOLUTION INTRAMUSCULAR; INTRAVENOUS; SUBCUTANEOUS EVERY 6 HOURS PRN
Status: DISCONTINUED | OUTPATIENT
Start: 2023-06-14 | End: 2023-06-15 | Stop reason: HOSPADM

## 2023-06-14 RX ORDER — LIDOCAINE HYDROCHLORIDE 10 MG/ML
1 INJECTION, SOLUTION EPIDURAL; INFILTRATION; INTRACAUDAL; PERINEURAL ONCE
Status: DISCONTINUED | OUTPATIENT
Start: 2023-06-14 | End: 2023-06-14 | Stop reason: HOSPADM

## 2023-06-14 RX ADMIN — ROCURONIUM BROMIDE 10 MG: 10 SOLUTION INTRAVENOUS at 08:06

## 2023-06-14 RX ADMIN — SODIUM CHLORIDE, SODIUM GLUCONATE, SODIUM ACETATE, POTASSIUM CHLORIDE AND MAGNESIUM CHLORIDE: 526; 502; 368; 37; 30 INJECTION, SOLUTION INTRAVENOUS at 08:06

## 2023-06-14 RX ADMIN — SODIUM CHLORIDE, SODIUM GLUCONATE, SODIUM ACETATE, POTASSIUM CHLORIDE AND MAGNESIUM CHLORIDE: 526; 502; 368; 37; 30 INJECTION, SOLUTION INTRAVENOUS at 07:06

## 2023-06-14 RX ADMIN — HYDROMORPHONE HYDROCHLORIDE 1 MG: 2 INJECTION, SOLUTION INTRAMUSCULAR; INTRAVENOUS; SUBCUTANEOUS at 09:06

## 2023-06-14 RX ADMIN — FENTANYL CITRATE 100 MCG: 50 INJECTION, SOLUTION INTRAMUSCULAR; INTRAVENOUS at 07:06

## 2023-06-14 RX ADMIN — GABAPENTIN 600 MG: 300 CAPSULE ORAL at 06:06

## 2023-06-14 RX ADMIN — ONDANSETRON 4 MG: 2 INJECTION INTRAMUSCULAR; INTRAVENOUS at 06:06

## 2023-06-14 RX ADMIN — HYDROMORPHONE HYDROCHLORIDE 0.4 MG: 2 INJECTION INTRAMUSCULAR; INTRAVENOUS; SUBCUTANEOUS at 09:06

## 2023-06-14 RX ADMIN — MIDAZOLAM HYDROCHLORIDE 2 MG: 1 INJECTION, SOLUTION INTRAMUSCULAR; INTRAVENOUS at 07:06

## 2023-06-14 RX ADMIN — SIMETHICONE 80 MG: 80 TABLET, CHEWABLE ORAL at 03:06

## 2023-06-14 RX ADMIN — DEXAMETHASONE SODIUM PHOSPHATE 8 MG: 4 INJECTION, SOLUTION INTRA-ARTICULAR; INTRALESIONAL; INTRAMUSCULAR; INTRAVENOUS; SOFT TISSUE at 07:06

## 2023-06-14 RX ADMIN — Medication 100 MCG: at 08:06

## 2023-06-14 RX ADMIN — LIDOCAINE HYDROCHLORIDE 80 MG: 20 INJECTION, SOLUTION EPIDURAL; INFILTRATION; INTRACAUDAL; PERINEURAL at 07:06

## 2023-06-14 RX ADMIN — ACETAMINOPHEN 1000 MG: 500 TABLET, FILM COATED ORAL at 06:06

## 2023-06-14 RX ADMIN — Medication 100 MCG: at 07:06

## 2023-06-14 RX ADMIN — ROCURONIUM BROMIDE 30 MG: 10 SOLUTION INTRAVENOUS at 08:06

## 2023-06-14 RX ADMIN — CEFAZOLIN 1 G: 330 INJECTION, POWDER, FOR SOLUTION INTRAMUSCULAR; INTRAVENOUS at 07:06

## 2023-06-14 RX ADMIN — KETOROLAC TROMETHAMINE 15 MG: 30 INJECTION, SOLUTION INTRAMUSCULAR; INTRAVENOUS at 09:06

## 2023-06-14 RX ADMIN — PROPOFOL 150 MG: 10 INJECTION, EMULSION INTRAVENOUS at 07:06

## 2023-06-14 RX ADMIN — Medication 200 MCG: at 07:06

## 2023-06-14 RX ADMIN — IBUPROFEN 600 MG: 600 TABLET, FILM COATED ORAL at 04:06

## 2023-06-14 RX ADMIN — ROCURONIUM BROMIDE 50 MG: 10 SOLUTION INTRAVENOUS at 07:06

## 2023-06-14 RX ADMIN — SUGAMMADEX 200 MG: 100 INJECTION, SOLUTION INTRAVENOUS at 09:06

## 2023-06-14 RX ADMIN — IBUPROFEN 600 MG: 600 TABLET, FILM COATED ORAL at 10:06

## 2023-06-14 RX ADMIN — CELECOXIB 200 MG: 200 CAPSULE ORAL at 06:06

## 2023-06-14 RX ADMIN — EPHEDRINE SULFATE 15 MG: 50 INJECTION INTRAVENOUS at 07:06

## 2023-06-14 NOTE — ANESTHESIA PREPROCEDURE EVALUATION
06/13/2023  Edilma Mahoney is a 47 y.o., female , who presents for the following:    Procedure: XI ROBOTIC HYSTERECTOMY (Pelvis) - RALH / BSO   Anesthesia type: General   Diagnosis: Excessive, frequent and irregular menstruation [N92.1]   Pre-op diagnosis: Excessive, frequent and irregular menstruation [N92.1]   Location: [facility] OR 06 / [facility] OR   Surgeons: Alexis Lynn MD     LAB:  04/11 1206 06/06 0913    HGB 14.8     13.2       WBC 10.7     8.20       Platelets 368     337            --       K+ 4.1     --       Creatinine 0.85     --       Glucose 91     --          EKG : NSR    Pre-op Assessment    I have reviewed the Patient Summary Reports.     I have reviewed the Nursing Notes. I have reviewed the NPO Status.   I have reviewed the Medications.     Review of Systems  Anesthesia Hx:  No problems with previous Anesthesia  Denies Family Hx of Anesthesia complications.   Denies Personal Hx of Anesthesia complications.   Social:  Non-Smoker    Cardiovascular:  Cardiovascular Normal     Pulmonary:  Pulmonary Normal    Endocrine:  Obesity / BMI > 30      Physical Exam  General: Alert and Oriented    Airway:  Mallampati: II   Mouth Opening: Normal  TM Distance: Normal  Tongue: Normal  Neck ROM: Normal ROM    Dental:  Intact    Chest/Lungs:  Normal Respiratory Rate    Heart:  Rate: Normal  Rhythm: Regular Rhythm        Anesthesia Plan  Type of Anesthesia, risks & benefits discussed:    Anesthesia Type: Gen ETT  Intra-op Monitoring Plan: Standard ASA Monitors  Post Op Pain Control Plan: IV/PO Opioids PRN and multimodal analgesia  Induction:  IV  Airway Plan: Direct, Post-Induction  Informed Consent: Informed consent signed with the Patient and all parties understand the risks and agree with anesthesia plan.  All questions answered. Patient consented to blood products? Yes  ASA Score: 2  Day of  Surgery Review of History & Physical: H&P Update referred to the surgeon/provider.  Anesthesia Plan Notes: ERAS / Multiple Antiemetics    Ready For Surgery From Anesthesia Perspective.     .

## 2023-06-14 NOTE — ANESTHESIA POSTPROCEDURE EVALUATION
Anesthesia Post Evaluation    Patient: Edilma Mahoney    Procedure(s) Performed: Procedure(s) (LRB):  XI ROBOTIC HYSTERECTOMY (N/A)    Final Anesthesia Type: general      Patient location during evaluation: PACU  Patient participation: Yes- Able to Participate  Level of consciousness: oriented and awake  Post-procedure vital signs: reviewed and stable  Pain management: adequate  Airway patency: patent    PONV status at discharge: No PONV, nausea (controlled) and vomiting (controlled)  Anesthetic complications: no      Cardiovascular status: hemodynamically stable  Respiratory status: spontaneous ventilation and unassisted  Hydration status: euvolemic  Follow-up not needed.  Comments: [facility]          Vitals Value Taken Time   /69 06/14/23 1121   Temp 36.6 °C (97.9 °F) 06/14/23 0924   Pulse 74 06/14/23 1123   Resp 12 06/14/23 1123   SpO2 92 % 06/14/23 1123   Vitals shown include unvalidated device data.      Event Time   Out of Recovery 11:46:30         Pain/Diamond Score: Pain Rating Prior to Med Admin: 6 (6/14/2023  9:48 AM)  Diamond Score: 10 (6/14/2023 11:30 AM)

## 2023-06-14 NOTE — OP NOTE
[facility]                       [address]                      [place] [address]    PATIENT NAME:      ROGELIO ROY  YOB: 1975  CSN:               [id number]  MRN:               [id number]  ADMIT DATE:        06/14/2023 04:57:00  PHYSICIAN:         Alexis Lynn MD                          OPERATIVE REPORT      DATE OF SURGERY:    06/14/2023 00:00:00    SURGEON:  Alexis Lynn MD    ASSISTANT:  Jimy Lynn MD    OPERATIONS PERFORMED:  Total laparoscopic hysterectomy and bilateral   salpingo-oophorectomy.    PREOPERATIVE DIAGNOSES:  The patient is a 47-year-old white female suffering   with menorrhagia and dysfunctional uterine bleeding, unresponsive to   conservative measures.    POSTOPERATIVE DIAGNOSES:  The patient is a 47-year-old white female suffering   with menorrhagia and dysfunctional uterine bleeding, unresponsive to   conservative measures.    PROCEDURE IN DETAIL:  The proper consents were obtained.  The patient was   brought to the operating room, placed in supine position, underwent general   anesthesia and intubation without difficulty.  She was then placed in dorsal   lithotomy position, prepped and draped in sterile fashion.  The MIC manipulator   was placed into the cervical canal.  A small incision was then made above the   umbilicus and a Veress needle was placed into the abdominal cavity.  2 L of CO2   was allowed to enter the abdominal cavity.  Trocars were then introduced without   difficulty.  The da Naz robotic system was then put into place and docked.    At this point in time, the contents of the pelvic cavity examined.  Uterus noted   to be in normal size, shape and position.  Both ovaries had numerous cysts on   it consistent with polycystic ovary syndrome and there are also adhesions   between the bladder and the lower uterine segment.  At this point in time, the   round  ligament was identified bilaterally, clamped, cauterized and cut.  The   infundibulopelvic ligaments were isolated, clamped, cauterized, and cut.  The   bladder was meticulously dissected below the level of the cervix, thus exposing   the uterine arteries.  Uterine arteries were then clamped, cauterized, and cut   bilaterally.  An anterior and posterior colpotomy incision was then made and the   ring was demonstrated.  The tissue at the 3 o'clock and 9 o'clock positions   were then dissected down to the ring, thus completing the colpotomy.  The   specimens were removed transvaginally.  The vaginal cuff was then closed with 2   sutures of Stratafix.  All pedicles were checked and found to be absolutely dry.    There was no evidence of any visceral injuries.  Both ureters were   peristalsing freely.  At this point in time, the CO2 was allowed to escape from   the abdominal cavity.  Trocars removed without difficulty.  Small incisions   closed with 3-0 Vicryl subcuticular stitch.  The patient was carefully brought   out of dorsal lithotomy position and brought to the recovery room in stable   fashion and tolerating the procedure well.        ______________________________  Alexis Lynn MD CEP/JOAQUINA  DD:  06/14/2023  Time:  12:00PM  DT:  06/14/2023  Time:  12:44PM  Job #:  [id number]      OPERATIVE REPORT

## 2023-06-14 NOTE — TRANSFER OF CARE
"Anesthesia Transfer of Care Note    Patient: Edilma Mahoney    Procedure(s) Performed: Procedure(s) (LRB):  XI ROBOTIC HYSTERECTOMY (N/A)    Patient location: PACU    Anesthesia Type: general    Transport from OR: Transported from OR on room air with adequate spontaneous ventilation    Post pain: adequate analgesia    Post assessment: no apparent anesthetic complications    Post vital signs: stable    Level of consciousness: sedated    Nausea/Vomiting: no nausea/vomiting    Complications: none    Transfer of care protocol was followed      Last vitals:   Visit Vitals  /83   Pulse 73   Temp 36.9 °C (98.4 °F) (Oral)   Resp 18   Ht 5' 5" (1.651 m)   Wt 97.6 kg (215 lb 2.7 oz)   SpO2 100%   Breastfeeding No   BMI 35.81 kg/m²     "

## 2023-06-14 NOTE — ANESTHESIA PROCEDURE NOTES
Intubation    Date/Time: 6/14/2023 7:26 AM  Performed by: Sravani Martinez  Authorized by: Jeb Sosa MD     Intubation:     Induction:  Intravenous    Intubated:  Postinduction    Mask Ventilation:  Easy with oral airway    Attempted By:  Student    Method of Intubation:  Direct    Blade:  Bai 2    Laryngeal View Grade: Grade I - full view of cords      Difficult Airway Encountered?: No      Complications:  None    Airway Device:  Oral endotracheal tube    Airway Device Size:  7.5    Style/Cuff Inflation:  Cuffed (inflated to minimal occlusive pressure)    Tube secured:  22    Secured at:  The lips    Placement Verified By:  Capnometry    Complicating Factors:  None    Findings Post-Intubation:  BS equal bilateral and atraumatic/condition of teeth unchanged

## 2023-06-15 VITALS
WEIGHT: 215.19 LBS | OXYGEN SATURATION: 96 % | DIASTOLIC BLOOD PRESSURE: 77 MMHG | RESPIRATION RATE: 18 BRPM | HEART RATE: 74 BPM | SYSTOLIC BLOOD PRESSURE: 132 MMHG | BODY MASS INDEX: 35.85 KG/M2 | TEMPERATURE: 98 F | HEIGHT: 65 IN

## 2023-06-15 PROBLEM — N93.9 ABNORMAL UTERINE BLEEDING (AUB): Status: ACTIVE | Noted: 2023-06-15

## 2023-06-15 LAB
BASOPHILS # BLD AUTO: 0.01 X10(3)/MCL
BASOPHILS NFR BLD AUTO: 0.1 %
EOSINOPHIL # BLD AUTO: 0 X10(3)/MCL (ref 0–0.9)
EOSINOPHIL NFR BLD AUTO: 0 %
ERYTHROCYTE [DISTWIDTH] IN BLOOD BY AUTOMATED COUNT: 13.4 % (ref 11.5–17)
HCT VFR BLD AUTO: 34.3 % (ref 37–47)
HGB BLD-MCNC: 11.7 G/DL (ref 12–16)
IMM GRANULOCYTES # BLD AUTO: 0.06 X10(3)/MCL (ref 0–0.04)
IMM GRANULOCYTES NFR BLD AUTO: 0.5 %
LYMPHOCYTES # BLD AUTO: 1.85 X10(3)/MCL (ref 0.6–4.6)
LYMPHOCYTES NFR BLD AUTO: 14.6 %
MCH RBC QN AUTO: 31 PG (ref 27–31)
MCHC RBC AUTO-ENTMCNC: 34.1 G/DL (ref 33–36)
MCV RBC AUTO: 91 FL (ref 80–94)
MONOCYTES # BLD AUTO: 0.57 X10(3)/MCL (ref 0.1–1.3)
MONOCYTES NFR BLD AUTO: 4.5 %
NEUTROPHILS # BLD AUTO: 10.14 X10(3)/MCL (ref 2.1–9.2)
NEUTROPHILS NFR BLD AUTO: 80.3 %
NRBC BLD AUTO-RTO: 0 %
PLATELET # BLD AUTO: 303 X10(3)/MCL (ref 130–400)
PMV BLD AUTO: 9.4 FL (ref 7.4–10.4)
RBC # BLD AUTO: 3.77 X10(6)/MCL (ref 4.2–5.4)
WBC # SPEC AUTO: 12.63 X10(3)/MCL (ref 4.5–11.5)

## 2023-06-15 PROCEDURE — 25000003 PHARM REV CODE 250: Performed by: OBSTETRICS & GYNECOLOGY

## 2023-06-15 PROCEDURE — G0378 HOSPITAL OBSERVATION PER HR: HCPCS

## 2023-06-15 PROCEDURE — 85025 COMPLETE CBC W/AUTO DIFF WBC: CPT | Performed by: OBSTETRICS & GYNECOLOGY

## 2023-06-15 RX ADMIN — SIMETHICONE 80 MG: 80 TABLET, CHEWABLE ORAL at 04:06

## 2023-06-15 RX ADMIN — OXYCODONE HYDROCHLORIDE AND ACETAMINOPHEN 1 TABLET: 5; 325 TABLET ORAL at 08:06

## 2023-06-15 RX ADMIN — IBUPROFEN 600 MG: 600 TABLET, FILM COATED ORAL at 04:06

## 2023-06-15 NOTE — DISCHARGE SUMMARY
[facility] - 3rd Floor Mother/Baby Unit  Colorectal Surgery  Discharge Summary      Patient Name: Edilma Mahoney  MRN: [id number]  Admission Date: 6/14/2023  Hospital Length of Stay: 0 days  Discharge Date and Time:  06/15/2023 10:30 AM  Attending Physician: Alexis Lynn   Discharging Provider: Alexis Lynn MD  Primary Care Provider: Nadir Salazar MD     HPI:  No notes on file    Procedure(s) (LRB):  XI ROBOTIC HYSTERECTOMY (N/A)     Hospital Course:  No notes on file    Goals of Care Treatment Preferences:             Significant Diagnostic Studies: N/A    Pending Diagnostic Studies:     Procedure Component Value Units Date/Time    Specimen to Pathology [[id number]] Collected: 06/14/23 0854    Order Status: Sent Lab Status: In process Updated: 06/14/23 1421    Specimen: Tissue from Uterus, Tissue from Fallopian tube and ovaries, Tissue from Cervix         Final Active Diagnoses:    Diagnosis Date Noted POA    PRINCIPAL PROBLEM:  Abnormal uterine bleeding (AUB) [N93.9] 06/15/2023 Unknown      Problems Resolved During this Admission:      Discharged Condition: good    Disposition: Home or Self Care    Follow Up:   Follow-up Information     Alexis Lynn MD. Go in 2 week(s).    Specialty: Obstetrics and Gynecology  Why: Go to follow-up appointment in 2 weeks  Contact information:  [address] [place] [id number]  [phone]                       Patient Instructions:   No discharge procedures on file.  Medications:  Reconciled Home Medications:      Medication List      ASK your doctor about these medications    levonorgestrel-ethinyl estradiol 0.15-0.03 mg per tablet  Commonly known as: NORDETTE  Take 1 tablet by mouth once daily.     pantoprazole 40 MG tablet  Commonly known as: PROTONIX  Take 1 tablet (40 mg total) by mouth once daily.            Alexis Lynn MD  Colorectal Surgery  [facility] - 3rd Floor Mother/Baby Unit

## 2023-06-16 LAB — PSYCHE PATHOLOGY RESULT: NORMAL

## 2023-10-11 DIAGNOSIS — Z12.31 SCREENING MAMMOGRAM FOR BREAST CANCER: Primary | ICD-10-CM

## 2023-10-19 ENCOUNTER — HOSPITAL ENCOUNTER (OUTPATIENT)
Dept: RADIOLOGY | Facility: HOSPITAL | Age: 48
Discharge: HOME | End: 2023-10-19
Attending: OBSTETRICS & GYNECOLOGY
Payer: COMMERCIAL

## 2023-10-19 DIAGNOSIS — Z12.31 SCREENING MAMMOGRAM FOR BREAST CANCER: ICD-10-CM

## 2023-10-19 PROCEDURE — 77067 SCR MAMMO BI INCL CAD: CPT | Mod: 26,,, | Performed by: STUDENT IN AN ORGANIZED HEALTH CARE EDUCATION/TRAINING PROGRAM

## 2023-10-19 PROCEDURE — 77067 SCR MAMMO BI INCL CAD: CPT | Mod: TC

## 2023-10-19 PROCEDURE — 77063 BREAST TOMOSYNTHESIS BI: CPT | Mod: 26,,, | Performed by: STUDENT IN AN ORGANIZED HEALTH CARE EDUCATION/TRAINING PROGRAM

## 2023-10-19 PROCEDURE — 77063 MAMMO DIGITAL SCREENING BILAT WITH TOMO: ICD-10-PCS | Mod: 26,,, | Performed by: STUDENT IN AN ORGANIZED HEALTH CARE EDUCATION/TRAINING PROGRAM

## 2023-10-19 PROCEDURE — 77067 MAMMO DIGITAL SCREENING BILAT WITH TOMO: ICD-10-PCS | Mod: 26,,, | Performed by: STUDENT IN AN ORGANIZED HEALTH CARE EDUCATION/TRAINING PROGRAM

## 2023-10-20 ENCOUNTER — LAB VISIT (OUTPATIENT)
Dept: LAB | Facility: HOSPITAL | Age: 48
End: 2023-10-20
Attending: OBSTETRICS & GYNECOLOGY
Payer: COMMERCIAL

## 2023-10-20 DIAGNOSIS — Z00.00 WELL ADULT EXAM: Primary | ICD-10-CM

## 2023-10-20 LAB
ALBUMIN SERPL-MCNC: 4 G/DL (ref 3.5–5)
ALBUMIN/GLOB SERPL: 1.4 RATIO (ref 1.1–2)
ALP SERPL-CCNC: 136 UNIT/L (ref 40–150)
ALT SERPL-CCNC: 16 UNIT/L (ref 0–55)
AST SERPL-CCNC: 20 UNIT/L (ref 5–34)
BILIRUB SERPL-MCNC: 0.5 MG/DL
BUN SERPL-MCNC: 14.7 MG/DL (ref 7–18.7)
CALCIUM SERPL-MCNC: 11.1 MG/DL (ref 8.4–10.2)
CHLORIDE SERPL-SCNC: 104 MMOL/L (ref 98–107)
CHOLEST SERPL-MCNC: 190 MG/DL
CHOLEST/HDLC SERPL: 4 {RATIO} (ref 0–5)
CO2 SERPL-SCNC: 28 MMOL/L (ref 22–29)
CREAT SERPL-MCNC: 0.85 MG/DL (ref 0.55–1.02)
DEPRECATED CALCIDIOL+CALCIFEROL SERPL-MC: 41.9 NG/ML (ref 30–80)
ERYTHROCYTE [DISTWIDTH] IN BLOOD BY AUTOMATED COUNT: 12.8 % (ref 11.5–17)
EST. AVERAGE GLUCOSE BLD GHB EST-MCNC: 99.7 MG/DL
ESTRADIOL SERPL HS-MCNC: <24 PG/ML
GFR SERPLBLD CREATININE-BSD FMLA CKD-EPI: >60 MLS/MIN/1.73/M2
GLOBULIN SER-MCNC: 2.9 GM/DL (ref 2.4–3.5)
GLUCOSE SERPL-MCNC: 96 MG/DL (ref 74–100)
HBA1C MFR BLD: 5.1 %
HCT VFR BLD AUTO: 41.5 % (ref 37–47)
HDLC SERPL-MCNC: 53 MG/DL (ref 35–60)
HGB BLD-MCNC: 14.1 G/DL (ref 12–16)
LDLC SERPL CALC-MCNC: 116 MG/DL (ref 50–140)
MCH RBC QN AUTO: 31.2 PG (ref 27–31)
MCHC RBC AUTO-ENTMCNC: 34 G/DL (ref 33–36)
MCV RBC AUTO: 91.8 FL (ref 80–94)
NRBC BLD AUTO-RTO: 0 %
PLATELET # BLD AUTO: 327 X10(3)/MCL (ref 130–400)
PMV BLD AUTO: 9.2 FL (ref 7.4–10.4)
POTASSIUM SERPL-SCNC: 4.7 MMOL/L (ref 3.5–5.1)
PROT SERPL-MCNC: 6.9 GM/DL (ref 6.4–8.3)
RBC # BLD AUTO: 4.52 X10(6)/MCL (ref 4.2–5.4)
SODIUM SERPL-SCNC: 141 MMOL/L (ref 136–145)
TESTOST SERPL-MCNC: 26.79 NG/DL (ref 13.84–53.35)
TRIGL SERPL-MCNC: 105 MG/DL (ref 37–140)
TSH SERPL-ACNC: 0.95 UIU/ML (ref 0.35–4.94)
VLDLC SERPL CALC-MCNC: 21 MG/DL
WBC # SPEC AUTO: 7 X10(3)/MCL (ref 4.5–11.5)

## 2023-10-20 PROCEDURE — 80061 LIPID PANEL: CPT

## 2023-10-20 PROCEDURE — 84443 ASSAY THYROID STIM HORMONE: CPT

## 2023-10-20 PROCEDURE — 80053 COMPREHEN METABOLIC PANEL: CPT

## 2023-10-20 PROCEDURE — 82306 VITAMIN D 25 HYDROXY: CPT

## 2023-10-20 PROCEDURE — 83036 HEMOGLOBIN GLYCOSYLATED A1C: CPT

## 2023-10-20 PROCEDURE — 85027 COMPLETE CBC AUTOMATED: CPT

## 2023-10-20 PROCEDURE — 84403 ASSAY OF TOTAL TESTOSTERONE: CPT

## 2023-10-20 PROCEDURE — 82670 ASSAY OF TOTAL ESTRADIOL: CPT

## 2023-10-20 PROCEDURE — 36415 COLL VENOUS BLD VENIPUNCTURE: CPT

## 2023-10-30 ENCOUNTER — TELEPHONE (OUTPATIENT)
Dept: ORTHOPEDICS | Facility: CLINIC | Age: 48
End: 2023-10-30
Payer: COMMERCIAL

## 2023-10-30 NOTE — TELEPHONE ENCOUNTER
Patient called stating that she is about 2 years post op from an ACL reconstruction. She is currently having knee pain, but would like to discuss to see if it is something she should come in for.     Please advise.     - Rina

## 2023-11-01 ENCOUNTER — OFFICE VISIT (OUTPATIENT)
Dept: ORTHOPEDICS | Facility: CLINIC | Age: 48
End: 2023-11-01
Payer: COMMERCIAL

## 2023-11-01 VITALS — DIASTOLIC BLOOD PRESSURE: 89 MMHG | SYSTOLIC BLOOD PRESSURE: 148 MMHG | TEMPERATURE: 99 F | HEART RATE: 88 BPM

## 2023-11-01 DIAGNOSIS — S83.402A SPRAIN OF COLLATERAL LIGAMENT OF LEFT KNEE, INITIAL ENCOUNTER: ICD-10-CM

## 2023-11-01 DIAGNOSIS — M25.562 LEFT KNEE PAIN, UNSPECIFIED CHRONICITY: Primary | ICD-10-CM

## 2023-11-01 DIAGNOSIS — M94.262 CHONDROMALACIA OF LEFT KNEE: ICD-10-CM

## 2023-11-01 PROCEDURE — 3044F PR MOST RECENT HEMOGLOBIN A1C LEVEL <7.0%: ICD-10-PCS | Mod: CPTII,,, | Performed by: ORTHOPAEDIC SURGERY

## 2023-11-01 PROCEDURE — 1160F RVW MEDS BY RX/DR IN RCRD: CPT | Mod: CPTII,,, | Performed by: ORTHOPAEDIC SURGERY

## 2023-11-01 PROCEDURE — 3077F SYST BP >= 140 MM HG: CPT | Mod: CPTII,,, | Performed by: ORTHOPAEDIC SURGERY

## 2023-11-01 PROCEDURE — 1159F PR MEDICATION LIST DOCUMENTED IN MEDICAL RECORD: ICD-10-PCS | Mod: CPTII,,, | Performed by: ORTHOPAEDIC SURGERY

## 2023-11-01 PROCEDURE — 1160F PR REVIEW ALL MEDS BY PRESCRIBER/CLIN PHARMACIST DOCUMENTED: ICD-10-PCS | Mod: CPTII,,, | Performed by: ORTHOPAEDIC SURGERY

## 2023-11-01 PROCEDURE — 3044F HG A1C LEVEL LT 7.0%: CPT | Mod: CPTII,,, | Performed by: ORTHOPAEDIC SURGERY

## 2023-11-01 PROCEDURE — 99214 PR OFFICE/OUTPT VISIT, EST, LEVL IV, 30-39 MIN: ICD-10-PCS | Mod: ,,, | Performed by: ORTHOPAEDIC SURGERY

## 2023-11-01 PROCEDURE — 3077F PR MOST RECENT SYSTOLIC BLOOD PRESSURE >= 140 MM HG: ICD-10-PCS | Mod: CPTII,,, | Performed by: ORTHOPAEDIC SURGERY

## 2023-11-01 PROCEDURE — 3079F PR MOST RECENT DIASTOLIC BLOOD PRESSURE 80-89 MM HG: ICD-10-PCS | Mod: CPTII,,, | Performed by: ORTHOPAEDIC SURGERY

## 2023-11-01 PROCEDURE — 1159F MED LIST DOCD IN RCRD: CPT | Mod: CPTII,,, | Performed by: ORTHOPAEDIC SURGERY

## 2023-11-01 PROCEDURE — 99214 OFFICE O/P EST MOD 30 MIN: CPT | Mod: ,,, | Performed by: ORTHOPAEDIC SURGERY

## 2023-11-01 PROCEDURE — 3079F DIAST BP 80-89 MM HG: CPT | Mod: CPTII,,, | Performed by: ORTHOPAEDIC SURGERY

## 2023-11-01 RX ORDER — VITAMIN E 268 MG
800 CAPSULE ORAL DAILY
COMMUNITY

## 2023-11-01 RX ORDER — MELOXICAM 15 MG/1
15 TABLET ORAL DAILY
Qty: 30 TABLET | Refills: 0 | Status: SHIPPED | OUTPATIENT
Start: 2023-11-01

## 2023-11-14 ENCOUNTER — HOSPITAL ENCOUNTER (OUTPATIENT)
Dept: RADIOLOGY | Facility: HOSPITAL | Age: 48
Discharge: HOME | End: 2023-11-14
Attending: OBSTETRICS & GYNECOLOGY
Payer: COMMERCIAL

## 2023-11-14 DIAGNOSIS — R92.8 ABNORMAL SCREENING MAMMOGRAM: ICD-10-CM

## 2023-11-14 PROCEDURE — 76642 US BREAST BILATERAL LIMITED: ICD-10-PCS | Mod: 26,50,, | Performed by: RADIOLOGY

## 2023-11-14 PROCEDURE — 76642 ULTRASOUND BREAST LIMITED: CPT | Mod: 26,50,, | Performed by: RADIOLOGY

## 2023-11-14 PROCEDURE — 76642 ULTRASOUND BREAST LIMITED: CPT | Mod: TC,50

## 2023-11-14 PROCEDURE — 77062 BREAST TOMOSYNTHESIS BI: CPT | Mod: TC

## 2023-11-14 PROCEDURE — 77062 BREAST TOMOSYNTHESIS BI: CPT | Mod: 26,,, | Performed by: RADIOLOGY

## 2023-11-14 PROCEDURE — 77066 DX MAMMO INCL CAD BI: CPT | Mod: 26,,, | Performed by: RADIOLOGY

## 2023-11-14 PROCEDURE — 77062 MAMMO DIGITAL DIAGNOSTIC BILAT WITH TOMO: ICD-10-PCS | Mod: 26,,, | Performed by: RADIOLOGY

## 2023-11-14 PROCEDURE — 77066 MAMMO DIGITAL DIAGNOSTIC BILAT WITH TOMO: ICD-10-PCS | Mod: 26,,, | Performed by: RADIOLOGY

## 2023-11-22 DIAGNOSIS — Z91.89 AT HIGH RISK FOR BREAST CANCER: Primary | ICD-10-CM

## 2024-01-02 NOTE — PROGRESS NOTES
[facility] Breast Surg  Breast Surgical Oncology  New Patient Office Visit - H&P      Referring Provider: Dr. Alexis Lynn  PCP: Nadir Salazar MD   Care Team:  OBGYN: No data on file.    Chief Complaint:   Chief Complaint   Patient presents with    Breast Cancer Screening     Patient reports right breast piecing pain in the right nipple         Subjective:     HPI:  Edilma Mahoney is a 48 y.o. female who presents on 1/04/2024 for evaluation of risk for breast cancer. Based on the Tyrer-Cuzick Breast Cancer Risk Model, her lifetime risk is calculated to be 36.5%. 5 year Liliana score of 2.1%     Patient is doing well today.  Patient underwent total hysterectomy with BSO in July of 2023.  She states since this time she has been experiencing bilateral intermittent breast pain.  She states that she experiences breast pain over entirety of both breast, but she states it is worse at her inframammary folds.  Patient states that she started taking vitamin-E supplement a couple of months ago, but she has not found any significant improvement in her breast pain yet.  She states the bras that she is wearing do have under wire, and that she probably needs to replace them as they are some years old. Patient states she does drink caffeine.  She currently denies any other breast issues including rashes, redness, swelling, nipple discharge, or new lumps/masses.  Patient states she does not perform self-breast exams.  Patient had screening mammogram done in October of 2023 which resulted in further imaging.  She had a bilateral diagnostic mammogram and ultrasound done in November of 2023 which resulted as benign.  Patient states she is currently going through menopause, as she had hysterectomy over 6 months ago.  She states she has been having symptoms such as hot flashes and mood swings.  Due to this, she recently started taking estrogen.  Patient states she does not currently live a  healthy lifestyle.  She  plans to start eating healthier.  She has not able to exercise currently due to limitations after knee surgery, but she is going to physical therapy to help with this.  Patient does not smoke and she drinks alcohol in moderation.  Patient has no other questions or concerns today.    Imaging:   10/19/2023 SC MG - Right breast 11:00 axis middle depth possible architectural distortion and left breast lateral region middle depth possible architectural distortion needs further evaluation. BI-RADS 0: Incomplete. Need additional imaging evaluation.   2023 BL DG  MG and US - No mammographic or sonographic evidence of malignancy. BIRADS: 2 Benign.     Pathology:   None     OB/GYN History:  Age at Menarche Onset: 9  Menopausal Status: postmenopausal, LMP: No LMP recorded. Patient has had a hysterectomy.  Hysterectomy/Oophorectomy: hysterectomy with BSO, at age 47  Hormonal birth control (duration): none  Pregnancy History:   Age at first live birth: 29  Hormone Replacement Therapy: Yes, Oral Estrogen    Other:  MG breast density: BIRADS C  Prior thoracic RT: none  Genetic testing: none  Ashkenazi Anabaptist descent: No    Family History:  History reviewed. No pertinent family history.     Patient History:  Past Medical History:   Diagnosis Date    Excessive, frequent and irregular menstruation     Obesity        Past Surgical History:   Procedure Laterality Date     SECTION  2004    HYSTERECTOMY  2023    KNEE ARTHROSCOPY W/ ACL RECONSTRUCTION Left     REPAIR OF MENISCUS OF KNEE Left     ROBOT-ASSISTED LAPAROSCOPIC ABDOMINAL HYSTERECTOMY USING DA YASMINE XI N/A 2023    Procedure: XI ROBOTIC HYSTERECTOMY;  Surgeon: Alexis Lynn MD;  Location: [facility];  Service: OB/GYN;  Laterality: N/A;  RALH / BSO       Social History     Socioeconomic History    Marital status:    Tobacco Use    Smoking status: Never    Smokeless tobacco: Never   Substance and Sexual Activity     "Alcohol use: Yes     Comment: occasionally    Drug use: Not Currently         There is no immunization history on file for this patient.    Medications/Allergies:    Current Outpatient Medications:     collagen/biotin/ascorbic acid (COLLAGEN 1500 PLUS C ORAL), Take by mouth., Disp: , Rfl:     estradioL (ESTRACE) 1 MG tablet, Take 1 mg by mouth., Disp: , Rfl:     meloxicam (MOBIC) 15 MG tablet, Take 1 tablet (15 mg total) by mouth once daily., Disp: 30 tablet, Rfl: 0    pantoprazole (PROTONIX) 40 MG tablet, Take 1 tablet (40 mg total) by mouth once daily. (Patient taking differently: Take 40 mg by mouth daily as needed.), Disp: 30 tablet, Rfl: 11    sucralfate (CARAFATE) 1 gram tablet, TAKE ONE TABLET BY MOUTH 4 TIMES DAILY BEFORE MEALS AND nightly FOR 7 DAYS, Disp: , Rfl:     tumeric-ging-olive-oreg-capryl 100 mg-150 mg- 50 mg-150 mg Cap, Take by mouth., Disp: , Rfl:     vitamin E 400 UNIT capsule, Take 800 Units by mouth once daily., Disp: , Rfl:     ALPRAZolam (XANAX) 0.5 MG tablet, Take 1 tablet (0.5 mg total) by mouth once as needed for Anxiety (take 1 hour prior to MRI, do not drive while taking this medication)., Disp: 1 tablet, Rfl: 0    levonorgestrel-ethinyl estradiol (NORDETTE) 0.15-0.03 mg per tablet, Take 1 tablet by mouth once daily., Disp: , Rfl:      Review of patient's allergies indicates:  No Known Allergies    Review of Systems:  All pertinent history in HPI.      Objective:     Vitals:  Vitals:    01/04/24 1132   BP: 121/81   BP Location: Right arm   Patient Position: Sitting   BP Method: Large (Automatic)   Pulse: 80   Resp: 18   Temp: 98 °F (36.7 °C)   TempSrc: Oral   SpO2: 97%   Weight: 100.3 kg (221 lb 1.6 oz)   Height: 5' 5" (1.651 m)       Body mass index is 36.79 kg/m².     Physical Exam:  General: The patient is awake, alert and oriented times three. The patient is well nourished and in no acute distress.  Neck: There is no evidence of palpable cervical, supraclavicular or axillary " adenopathy. The neck is supple. The thyroid is not enlarged.  Musculoskeletal: The patient has a normal range of motion of her bilateral upper extremities.  Chest: Examination of the chest wall fails to reveal any obvious abnormalities.  The lungs are clear to auscultation bilaterally without rales, rhonchi, or wheezing.  Cardiovascular: The heart has a regular rate and rhythm without murmurs, gallops or rubs.  Breast:   Right:  Examination of right breast fails to reveal any dominant masses or areas of significant focal nodularity. The nipple is everted without evidence of discharge. There is no skin dimpling with movement of the pectoralis. There is no significant skin changes overlying the breast.   Left:  Examination of the left breast fails to reveal any dominant masses or areas of significant focal nodularity. The nipple is everted without evidence of discharge. There is no skin dimpling with movement of the pectoralis. There are no significant skin changes overlying the breast.  Abdomen: The abdomen is soft, flat, nontender and nondistended with no palpable masses or organomegaly.  Integumentary: no rashes or skin lesions present  Neurologic: cranial nerves intact, no signs of peripheral neurological deficit, motor/sensory function intact    Assessment:     Patient Active Problem List   Diagnosis    Abnormal uterine bleeding (AUB)        Edilma was seen today for breast cancer screening.    Diagnoses and all orders for this visit:    At high risk for breast cancer  -     Ambulatory referral/consult to Breast Surgery  -     Mammo Digital Screening Bilat w/ Josue; Future  -     MRI Breast w/wo Contrast, w/CAD, Bilateral; Future    Screening mammogram for breast cancer  -     Mammo Digital Screening Bilat w/ Josue; Future    Family history of breast cancer  -     Mammo Digital Screening Bilat w/ Josue; Future  -     MRI Breast w/wo Contrast, w/CAD, Bilateral; Future    Situational anxiety  -     ALPRAZolam (XANAX)  0.5 MG tablet; Take 1 tablet (0.5 mg total) by mouth once as needed for Anxiety (take 1 hour prior to MRI, do not drive while taking this medication).          --------------------------------------------------------------------------------------------------------------  After the initial clinical evaluation nearly 30 minutes were on counseling the patients regarding the options for management. Risk reduction strategies were discussed.     1. Lifestyle factors: As with other types of cancer, studies continue to show that various lifestyle factors may contribute to the development of breast cancer.     Weight: Recent studies have shown that postmenopausal women who are overweight or obese have an increased risk of breast cancer. These women also have a higher risk of having the cancer come back after treatment.     Physical activity: Decreased physical activity is associated with an increased risk of developing breast cancer and a higher risk of having the cancer come back after treatment. Regular physical activity may protect against breast cancer by helping women maintain a healthy body weight, lowering hormone levels, or causing changes in a womens metabolism or immune factors.     Alcohol: Current research suggests that having more than 1 to 2 alcoholic drinks, including beer, wine, and spirits, per day raises the risk of breast cancer, as well as the risk of having the cancer come back after treatment.     Food: There is no reliable research that confirms that eating or avoiding specific foods reduces the risk of developing breast cancer or having the cancer come back after treatment. However, eating more fruits and vegetables and fewer animal fats is linked with many health benefits.     2. Prevention:  Surgery to lower cancer risk: For women with BRCA1 or BRCA2 genetic mutations, which substantially increase the risk of breast cancer, preventive removal of the breasts may be considered. The procedure, called  "a prophylactic mastectomy, appears to reduce the risk of developing breast cancer by at least 95%. Women with these mutations should also consider the preventive removal of the ovaries and fallopian tubes, called a prophylactic salpingo-oophorectomy. This procedure can reduce the risk of developing ovarian cancer, as well as breast cancer, by stopping the ovaries from making estrogen.      Drugs to lower cancer risk (Chemoprevention): Women who have a higher than usual risk of developing breast cancer may consider certain drugs that may help prevent breast cancer. This approach may also be called "chemoprevention." For breast cancer, this is the use of hormone-blocking drugs to reduce cancer risk. The drugs, tamoxifen (Soltamox) and raloxifene (Evista), are approved by the U.S. Food and Drug Administration (FDA) to lower breast cancer risk. These drugs are called selective estrogen receptor modulators (SERMs) and are not chemotherapy. A SERM is a medication that blocks estrogen receptors in some tissues and not others. Both women who have gone through menopause and those who have not may take tamoxifen. Raloxifene is only approved for women who have gone through menopause. Each drug also has different side effects.     Aromatase inhibitors (AIs) have also been shown to lower breast cancer risk. AIs are a type of hormone-blocking treatment that reduces the amount of estrogen in a woman's body by stopping tissues and organs other than the ovaries from producing estrogen. They can only be used by women who have gone through menopause. However, no AIs have been approved by the FDA for lowering breast cancer risk in women who do not have the disease.     3. Surveillance: Women with a known genetic mutation should follow screening guidelines per the NCCN guidelines. Women with no known genetic mutation  and found to be at greater than 20 percent average lifetime risk of breast cancer are recommended for the following " screening recommendations:     Clinical Breast exam: Every 6-12 months starting at age found to be at increased risk by risk model     Mammogram: Per NCCN guidelines, recommended every year starting 10 years younger than the youngest breast cancer case in the family (but not before age 30). May consider beginning breast MRIs at age 30 per ACR guidelines if desired by patient or other clinical considerations. May also consider getting a baseline MG at time of initial high risk consultation, if not already obtained.     Breast MRI: Per NCCN guidelines, recommended every year starting 10 years younger than the youngest breast cancer case in the family (but not before age 25). May consider beginning breast MRIs at age 30 per ACR guidelines if desired by patient or other clinical considerations. If patient has a first-degree relative with a BRCA1/2 gene mutation, youre encouraged to get genetic counseling and/or testing before getting MRI as part of screening (for those who do not wish to have genetic testing, MRI is recommended). Breast MRI in combination with mammography is better than mammography alone at finding breast cancer in certain women at higher than average risk.     --------------------------------------------------------------------------------------------------------------  Plan:       1. Lifestyle - Healthy lifestyle guidelines were reviewed. She was encouraged to engage in regular exercise, maintain a healthy body weight, and avoid excessive alcohol consumption. Healthy nutritional guidelines were also discussed. Self-breast examination was reviewed with the patient in detail and she was encouraged to perform this on a monthly basis.    2. Surveillance - She desires undergoing high risk screening with annual screening mammograms and breast MRIs. In the absence of significant clinical findings in the interval, I recommend breast MRI in April 2024 in screening mammogram in October 2024.  Of note, patient  does have claustrophobia and will require Xanax in order to undergo breast MRI.  She will also undergo 20 minute breast MRI in [place].     3. Follow up- RTC in 6 months.    4. Prevention - We had a brief discussion/education about indications for preventative mastectomy or chemoprevention.  She displayed mild interest in chemoprevention, but does not want to start at this time.    5. Genetics - According to NCCN guidelines, she meets criteria for genetic testing. Referral to genetic counseling placed.    6. Breast Pain - I discussed ways to reduce breast pain/tenderness. Avoid caffeine (coffee, teas, chocolate, sodas). Drinking alcohol may also increase the risk of fibrocystic changes and breast pain. I also advised to wear a good, supportive bra both day and night when symptoms are worse. Avoid contact sports and other activities which could cause injury to the breasts. She may also consider taking Vitamin E/Primrose Oil supplementation to reduce pain.  She may consider Voltaren gel as needed for breast pain.    7. Other routine screening exams:   -  Recommend annual follow up with PCP   -  Recommend annual follow up with GYN for pap smears/gynecologic exams and CBE      All of her questions were answered. She was advised to call if she develops any questions or concerns.    Anita Maya PA-C     --------------------------------------------------------------------------------------------------------------  Total time on the date of the visit ranged from 60-74 mins (32603). Total time includes both face-to-face and non-face-to-face time personally spent by myself on the day of the visit.    Non-face-to-face time included:  _X_ preparing to see the patient such as reviewing the patient record  _X_ obtaining and reviewing separately obtained history  _X_ independently interpreting results  _X_ documenting clinical information in electronic health record.    Face-to-face time included:  _X_ performing an  appropriate history and examination  _X_ communicating results to the patient  _X_ counseling and educating the patient  __ ordering appropriate medications  _x_ ordering appropriate tests  _X_ ordering appropriate procedures (including follow-up)  _X_ answering any questions the patient had    Total Time spent on date of visit: 60 minutes

## 2024-01-04 ENCOUNTER — OFFICE VISIT (OUTPATIENT)
Dept: SURGERY | Facility: CLINIC | Age: 49
End: 2024-01-04
Payer: COMMERCIAL

## 2024-01-04 VITALS
WEIGHT: 221.13 LBS | SYSTOLIC BLOOD PRESSURE: 121 MMHG | OXYGEN SATURATION: 97 % | TEMPERATURE: 98 F | DIASTOLIC BLOOD PRESSURE: 81 MMHG | HEIGHT: 65 IN | RESPIRATION RATE: 18 BRPM | HEART RATE: 80 BPM | BODY MASS INDEX: 36.84 KG/M2

## 2024-01-04 DIAGNOSIS — Z80.3 FAMILY HISTORY OF BREAST CANCER: ICD-10-CM

## 2024-01-04 DIAGNOSIS — F41.8 SITUATIONAL ANXIETY: ICD-10-CM

## 2024-01-04 DIAGNOSIS — Z76.89 ENCOUNTER TO ESTABLISH CARE: Primary | ICD-10-CM

## 2024-01-04 DIAGNOSIS — Z91.89 AT HIGH RISK FOR BREAST CANCER: ICD-10-CM

## 2024-01-04 DIAGNOSIS — Z12.31 SCREENING MAMMOGRAM FOR BREAST CANCER: ICD-10-CM

## 2024-01-04 PROCEDURE — 3079F DIAST BP 80-89 MM HG: CPT | Mod: CPTII,S$GLB,,

## 2024-01-04 PROCEDURE — 3008F BODY MASS INDEX DOCD: CPT | Mod: CPTII,S$GLB,,

## 2024-01-04 PROCEDURE — 1160F RVW MEDS BY RX/DR IN RCRD: CPT | Mod: CPTII,S$GLB,,

## 2024-01-04 PROCEDURE — 3074F SYST BP LT 130 MM HG: CPT | Mod: CPTII,S$GLB,,

## 2024-01-04 PROCEDURE — 99999 PR PBB SHADOW E&M-EST. PATIENT-LVL V: CPT | Mod: PBBFAC,,,

## 2024-01-04 PROCEDURE — 99215 OFFICE O/P EST HI 40 MIN: CPT | Mod: S$GLB,,,

## 2024-01-04 PROCEDURE — 1159F MED LIST DOCD IN RCRD: CPT | Mod: CPTII,S$GLB,,

## 2024-01-04 RX ORDER — SUCRALFATE 1 G/1
TABLET ORAL
COMMUNITY
Start: 2023-04-11

## 2024-01-04 RX ORDER — ESTRADIOL 1 MG/1
1 TABLET ORAL
COMMUNITY
Start: 2023-12-15

## 2024-01-04 RX ORDER — ALPRAZOLAM 0.5 MG/1
0.5 TABLET ORAL ONCE AS NEEDED
Qty: 1 TABLET | Refills: 0 | Status: SHIPPED | OUTPATIENT
Start: 2024-01-04 | End: 2024-01-04

## 2024-01-24 ENCOUNTER — OFFICE VISIT (OUTPATIENT)
Dept: HEMATOLOGY/ONCOLOGY | Facility: CLINIC | Age: 49
End: 2024-01-24
Payer: COMMERCIAL

## 2024-01-24 DIAGNOSIS — Z80.0 FAMILY HISTORY OF STOMACH CANCER: ICD-10-CM

## 2024-01-24 DIAGNOSIS — Z80.3 FAMILY HISTORY OF BREAST CANCER: ICD-10-CM

## 2024-01-24 DIAGNOSIS — Z80.0 FAMILY HISTORY OF COLON CANCER: Primary | ICD-10-CM

## 2024-01-24 DIAGNOSIS — Z91.89 AT HIGH RISK FOR BREAST CANCER: ICD-10-CM

## 2024-01-24 PROCEDURE — 99205 OFFICE O/P NEW HI 60 MIN: CPT | Mod: 95,,, | Performed by: NURSE PRACTITIONER

## 2024-01-24 PROCEDURE — 1159F MED LIST DOCD IN RCRD: CPT | Mod: CPTII,95,, | Performed by: NURSE PRACTITIONER

## 2024-01-24 NOTE — PROGRESS NOTES
REFERRING PROVIDER: JANNA Ivory    Subjective:       Patient ID: Edilma Mahoney is a 48 y.o. female.    Chief Complaint: No personal history of cancer but a family history of cancer. Patient presented via Telemedicine Visit (audio and video) today for risk assessment, genetic counseling, and consideration for genetic testing.    HPI  Past Medical History:   Diagnosis Date    Excessive, frequent and irregular menstruation     Obesity         Past Surgical History:   Procedure Laterality Date     SECTION  2004    HYSTERECTOMY  2023    KNEE ARTHROSCOPY W/ ACL RECONSTRUCTION Left     REPAIR OF MENISCUS OF KNEE Left     ROBOT-ASSISTED LAPAROSCOPIC ABDOMINAL HYSTERECTOMY USING DA YASMINE XI N/A 2023    Procedure: XI ROBOTIC HYSTERECTOMY;  Surgeon: Alexis Lynn MD;  Location: [facility];  Service: OB/GYN;  Laterality: N/A;  RALH / BSO        Review of patient's allergies indicates:  Patient has no known allergies.     Review of Systems      Oncology History    No history exists.      Family History   Problem Relation Age of Onset    Breast cancer Mother 30    Prostate cancer Father 66    Stomach cancer Maternal Uncle     Colon cancer Maternal Cousin 44    Colon cancer Paternal Uncle           Assessment:   Risk Assessment:  This patient is at increased risk of having a genetic mutation that increases the risk of cancer. Patient meets criteria for genetic testing based on the National Comprehensive Cancer Network (NCCN) criteria due to a family history that includes breast cancer diagnosed under 45y (mother dx30y), with additional evidence due to a family history of colon cancer diagnosed under 50y (maternal 1st cousin dx44y) and stomach cancer (maternal uncle)  (see family history and pedigree). Based on the likelihood of having a mutation, BRCA1/2 Analyses with [employer] testing through [employer] was described in detail.    Education and Counseling:  [employer] is a  gene panel that evaluates a broad number of hereditary cancer syndromes to help define patients' cancer risk with a focus on eight primary cancer sites (breast, ovarian, gastric, colorectal, pancreatic, melanoma, prostate, and endometrial). This test is appropriate for patients that meet criteria for genetic testing for Breast and Ovarian Cancer Syndrome. This panel will test for genes that increase cancer risk APC, ANGELO, AXIN2, BAP1, BARD1, BMPR1A, BRCA1, BRCA2, BRIP1, CDH1, CDK4, CDKN2A, CHEK2, CTNNA1, FH, FLCN, HOXB13 (seq only), MEN1, MET, MLH1, MSH2, MSH3 (excluding repetitive portions of exon 1), MSH6, MUTYH, NTHL1, PALB2, PMS2, PTEN, RAD51C, RAD51D, SDHA, SDHB, SDHC, SDHD, SMAD4, STK11, TP53, TSC1, TSC2, VHL. Limited promoter regions may also be analyzed for large rearrangements. Sequencing (seq) and/or large rearrangement (LR) analyses were performed only for the gene portions indicated in parenthesis for the following genes: EGFR (exons 18-21, seq and LR), EPCAM (exons 8-9, LR only), GREM1 (exon 1 and upstream regulatory regions, LR only), MITF (c.952, seq only), POLE (exonuclease domain, seq only), POLD1 (exonuclease domain, seq only), RET (exons 5, 8, 10, 11, 13-16 seq and LR), TERT (promoter region 71 bases upstream of the translation start, c.-71_-1, seq only)    Risks of cancer associated with inherited cancer predisposition mutations were discussed in detail.  If a mutation were found, this patient would have a significantly increased risk for cancer.  It has been shown that individuals with a BRCA1 or BRCA2 mutation face a 56-87% lifetime risk of breast cancer and a 27-44% lifetime risk of ovarian cancer. Mutation carriers who have had breast cancer have a 20% (BRCA1) or 12% (BRCA2) chance of developing a second breast cancer within 5 years, and up to 64% (BRCA1) or 52% (BRCA2) of a second breast cancer by age 70. Mutation carriers have up to a 5% risk of pancreatic cancer, as well as increased risk  for other cancers such as colon cancer and lymphoma. Other inherited cancer syndromes that are also included in the myRisk test, may have different, but still significant risk for cancer.    The availability of clinical management options for inherited cancer predisposition mutation carriers was discussed, including increased surveillance, chemo prevention, and prophylactic surgery. Details of the testing process, including benefits and limitations of genetic analysis as well as the implications of possible test results, were discussed.  Because this patient is the first member of the family to be tested comprehensive testing was presented.  Related insurance issues were discussed.      Summary:  This patient was evaluated for hereditary risk of cancer and was found to be at an increased risk of having an inherited cancer predisposition mutation.  The option of genetic testing was explained in detail, including the possible impact of this information on family members.  Since this patient wishes to proceed with testing an informed consent will be obtained and blood drawn and sent to [facility].  Results will be expected 4 weeks from this time.  A follow-up appointment will be scheduled for results disclosure.        The patient location is: home    Visit type: audiovisual    Face to Face time with patient: 40 minutes  >60 minutes of total time spent on the encounter, which includes face to face time and non-face to face time preparing to see the patient (eg, review of tests), Obtaining and/or reviewing separately obtained history, Documenting clinical information in the electronic or other health record, Independently interpreting results (not separately reported) and communicating results to the patient/family/caregiver, or Care coordination (not separately reported).         Each patient to whom he or she provides medical services by telemedicine is:  (1) informed of the relationship between the  physician and patient and the respective role of any other health care provider with respect to management of the patient; and (2) notified that he or she may decline to receive medical services by telemedicine and may withdraw from such care at any time.      TAYLOR RODAS, PhD    Answers submitted by the patient for this visit:  Review of Systems Questionnaire (Submitted on 1/24/2024)  appetite change : No  unexpected weight change: No  mouth sores: Yes  visual disturbance: No  cough: No  shortness of breath: No  chest pain: No  abdominal pain: No  diarrhea: No  frequency: No  back pain: No  rash: Yes  headaches: No  adenopathy: Yes  nervous/ anxious: No

## 2024-01-29 ENCOUNTER — PATIENT MESSAGE (OUTPATIENT)
Dept: HEMATOLOGY/ONCOLOGY | Facility: CLINIC | Age: 49
End: 2024-01-29
Payer: COMMERCIAL

## 2024-01-29 ENCOUNTER — TELEPHONE (OUTPATIENT)
Dept: HEMATOLOGY/ONCOLOGY | Facility: CLINIC | Age: 49
End: 2024-01-29
Payer: COMMERCIAL

## 2024-02-27 NOTE — PROGRESS NOTES
REFERRING PHYSICIAN: JANNA Ivory     Subjective:       Patient ID: Edilma Mahoney is a 48 y.o. female.    Chief Complaint: No personal history of cancer but a family history of cancer. Patient presented for genetic counseling on 1/24/2024 and was found appropriate for genetic testing based on the National Comprehensive Cancer Network (NCCN) criteria due to a family history that includes breast cancer diagnosed under 45y (mother dx30y), with additional evidence due to a family history of colon cancer diagnosed under 50y (maternal 1st cousin dx44y) and stomach cancer (maternal uncle)  (see family history and pedigree). She signed consent, lab was drawn and sent to [facility] for BRCA1/2 Analyses with [facility] panel testing. She presented via Telemedicine Visit (audio and video) today for results disclosure.     HPI  Past Medical History:   Diagnosis Date    Excessive, frequent and irregular menstruation     Obesity       Review of patient's allergies indicates:  No Known Allergies   Review of Systems          Problem List Items Addressed This Visit    None    Oncology History    No history exists.          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 This pathogenic variant is denoted MITF c.952G>A at the cDNA level, p.Vpu609Cqf (E318K) at the protein level, and results in the change of a Glutamic Acid to a Lysine (GAA>AAA). The MITF E318K variant has been linked to a fivefold increased risk of melanoma or RCC or both cancers.     Several case-control studies have revealed MITF Ptx875Nvi to be significantly associated with melanoma risk, particularly multiple primary melanomas (Benito 2011, Suha 2011, Veronica 2013,  Saravanan 2014, Pete 2015, Atul 2016). Functional analyses of MITF Jtg525Pqb have demonstrated impaired sumoylation and differentially regulated expression of several MITF target genes in comparison to wild type protein (Benito 2011, Suha 2011, Stephen 2013, Migdalia 2017). MITF Eyl433Rfh was observed at an allele frequency of 0.25% (315/126,616) in individuals of  ancestry in large population cohorts (Brad 2016). This variant is located in the DNA binding regulation region and in a small-ubiquitin-like modifier (SUMO) consensus site (Benito 2011, UniProt). Although in silico analysis predicts that this variant does not alter protein structure/function, published functional analysis supports a pathogenic effect. Based on currently available evidence, this variant is considered to be pathogenic.    National Comprehensive Cancer Network (NCCN) currently has no management guidelines specific to MITF related cancer risks. Per LAUREN Millan, et.al.,  individuals with MITF mutations should be educated on the importance of melanoma prevention and early detection. These individuals should be instructed on photoprotection and monthly self-skin screening examinations and should receive a regular skin screening examination by a medical professional. The frequency of examination by a health care provider should be tailored to account for the melanoma status and the difficulty of the examination, with higher-risk individuals receiving more frequent examinations ranging from every 3 to 12 months. If the individual has a personal history of melanoma, examinations should be in accordance with NCCN guidelines. If an individual has a large number of atypical nevi, it may be important to increase the frequency of appointments to 3-6 months to enhance surveillance. Children should begin self-screening as early as possible and provider-based screening should be initiated around the time of puberty. Monitoring these  individuals with longitudinal photography and digital dermoscopy is helpful. A reduced threshold for biopsies of a suspicious lesion is reasonable in these patients (2017, Identification, genetic testing, and management of hereditary melanoma, Cancer Metastasis Rev).      Each first degree relative of this patient has a 50% risk of having this same mutation. Testing of one or both of her parents may help to determine which side of the family is at risk of having this mutation. Family members can be tested with specific site analysis. A copy of a simple letter of explanation will be emailed to him to assist with informing family members.    A copy of this note and her genetic test result will be faxed to her dermatologist: Dr. Yamilet Esquivel, and her optometrist Dr. Dottie Rouse (with Dr. Chirag Leon).    A copy of her result and letter to inform family members will be emailed to: [email or web address]       The patient location is: home    Visit type: audiovisual    Face to Face time with patient: 15 minutes  30 minutes of total time spent on the encounter, which includes face to face time and non-face to face time preparing to see the patient (eg, review of tests), Obtaining and/or reviewing separately obtained history, Documenting clinical information in the electronic or other health record, Independently interpreting results (not separately reported) and communicating results to the patient/family/caregiver, or Care coordination (not separately reported).         Each patient to whom he or she provides medical services by telemedicine is:  (1) informed of the relationship between the physician and patient and the respective role of any other health care provider with respect to management of the patient; and (2) notified that he or she may decline to receive medical services by telemedicine and may withdraw from such care at any time.    TAYLOR RODAS, PhD

## 2024-02-28 ENCOUNTER — OFFICE VISIT (OUTPATIENT)
Dept: HEMATOLOGY/ONCOLOGY | Facility: CLINIC | Age: 49
End: 2024-02-28
Payer: COMMERCIAL

## 2024-02-28 DIAGNOSIS — Z15.89 MONOALLELIC MUTATION OF MITF GENE: Primary | ICD-10-CM

## 2024-02-28 DIAGNOSIS — Z15.09 MONOALLELIC MUTATION OF MITF GENE: Primary | ICD-10-CM

## 2024-02-28 PROCEDURE — 99214 OFFICE O/P EST MOD 30 MIN: CPT | Mod: 95,,, | Performed by: NURSE PRACTITIONER

## 2024-02-28 PROCEDURE — 1159F MED LIST DOCD IN RCRD: CPT | Mod: CPTII,95,, | Performed by: NURSE PRACTITIONER

## 2024-07-10 NOTE — PROGRESS NOTES
[facility] Breast Surg  Breast Surgical Oncology  New Patient Office Visit - H&P      Referring Provider: No ref. provider found  PCP: Nadir Salazar MD   Care Team:  OBGYN: No data on file.    Chief Complaint:   Chief Complaint   Patient presents with    Follow-up     6 month follow up visit.        Subjective:     Interval:  07/11/2024 Patient here for six-month high-risk follow-up.  She is doing well today.  She currently denies any breast complaints today. Patient underwent breast MRI in April which resulted as negative. Patient also met with genetic counselor, Emilie Rocha, in the interval and underwent genetic testing. Genetic testing results were positive for mutation in MITF gene. Patient is seeing a dermatologist for annual skin checks. Patient is still currently taking oral estrogen for hormone replacement, and she has been on it for about a year since her hysterectomy.  Patient states HRT is helping with stabilizing her mood, however, she is still having some sexual dysfunction.  Patient states she recently tried testosterone injections with her gynecologist, however, she found no relief in sexual dysfunction. Thus, patient does not think she is going to continue with testosterone injections at this time.  Patient is otherwise doing well, and she denies any changes in family history or any other significant interval changes. She has no other questions or concerns today.    HPI:  Edilma Mahoney is a 48 y.o. female who presents on 1/04/2024 for evaluation of risk for breast cancer. Based on the Tyrer-Cuzick Breast Cancer Risk Model, her lifetime risk is calculated to be 36.5%. 5 year Liliana score of 2.1%     Patient is doing well today. Patient underwent total hysterectomy with BSO in July of 2023.  She states since this time she has been experiencing bilateral intermittent breast pain.  She states that she experiences breast pain over entirety of both breast, but  she states it is worse at her inframammary folds.  Patient states that she started taking vitamin-E supplement a couple of months ago, but she has not found any significant improvement in her breast pain yet.  She states the bras that she is wearing do have under wire, and that she probably needs to replace them as they are some years old. Patient states she does drink caffeine.  She currently denies any other breast issues including rashes, redness, swelling, nipple discharge, or new lumps/masses.  Patient states she does not perform self-breast exams.  Patient had screening mammogram done in October of 2023 which resulted in further imaging.  She had a bilateral diagnostic mammogram and ultrasound done in November of 2023 which resulted as benign.  Patient states she is currently going through menopause, as she had hysterectomy over 6 months ago.  She states she has been having symptoms such as hot flashes and mood swings. Due to this, she recently started taking estrogen.  Patient states she does not currently live a healthy lifestyle. She  plans to start eating healthier.  She has not able to exercise currently due to limitations after knee surgery, but she is going to physical therapy to help with this.  Patient does not smoke and she drinks alcohol in moderation. Patient has no other questions or concerns today.    Imaging:   10/19/2023 SC MG - Right breast 11:00 axis middle depth possible architectural distortion and left breast lateral region middle depth possible architectural distortion needs further evaluation. BI-RADS 0: Incomplete. Need additional imaging evaluation.   11/14/2023 BL DG  MG and US - No mammographic or sonographic evidence of malignancy. BIRADS: 2 Benign.   4/8/2024 Breast MRI - No MRI evidence of malignancy in either breast. BI-RADS: 1 Negative     Pathology:   None     OB/GYN History:  Age at Menarche Onset: 9  Menopausal Status: postmenopausal, LMP: No LMP recorded. Patient has had a  hysterectomy.  Hysterectomy/Oophorectomy: hysterectomy with BSO, at age 47  Hormonal birth control (duration): none  Pregnancy History:   Age at first live birth: 29  Hormone Replacement Therapy: Yes, Oral Estrogen    Other:  MG breast density: BIRADS C  Prior thoracic RT: none  Genetic testing: pathogenic heterozygous mutation in MITF c.952G>A (p.[id number]).   Ashkenazi Muslim descent: No    Family History:  Family History   Problem Relation Name Age of Onset    Breast cancer Mother Fe 30    Arthritis Mother Fe     Rheum arthritis Mother Fe     Prostate cancer Father Lora 66    Arthritis Father Lora     Diabetes Father Lora     Hearing loss Father Lora     Hypertension Father Lora     Stomach cancer Maternal Uncle      Colon cancer Maternal Cousin  44    Colon cancer Paternal Uncle      Arthritis Maternal Grandmother Kaycee     COPD Brother Bradly         Patient History:  Past Medical History:   Diagnosis Date    Abnormal uterine bleeding     Total hysterectomy performed    Excessive, frequent and irregular menstruation     Hormone replacement therapy 2023    Post op hysterectomy    Menopause     Hysterectomy    Obesity     Pregnancy 2004       Past Surgical History:   Procedure Laterality Date    ABDOMINAL SURGERY       SECTION  2004    HYSTERECTOMY  2023    HYSTEROSCOPY      Uterine biopsy    KNEE ARTHROSCOPY W/ ACL RECONSTRUCTION Left     OOPHORECTOMY  2023    At time of hysterectomy    REPAIR OF MENISCUS OF KNEE Left     ROBOT-ASSISTED LAPAROSCOPIC ABDOMINAL HYSTERECTOMY USING DA YASMINE XI N/A 2023    Procedure: XI ROBOTIC HYSTERECTOMY;  Surgeon: Alexis Lynn MD;  Location: [facility];  Service: OB/GYN;  Laterality: N/A;  RALH / BSO       Social History     Socioeconomic History    Marital status:    Tobacco Use    Smoking status: Never    Smokeless tobacco: Never   Substance and Sexual Activity    Alcohol use: Yes      Alcohol/week: 4.0 standard drinks of alcohol     Types: 2 Glasses of wine, 2 Cans of beer per week     Comment: Drinks socially, not daily    Drug use: Never    Sexual activity: Yes     Partners: Male     Birth control/protection: Post-menopausal     Comment: Hysterectomy         There is no immunization history on file for this patient.    Medications/Allergies:    Current Outpatient Medications:     collagen/biotin/ascorbic acid (COLLAGEN 1500 PLUS C ORAL), Take by mouth., Disp: , Rfl:     estradioL (ESTRACE) 1 MG tablet, Take 1 mg by mouth., Disp: , Rfl:     levonorgestrel-ethinyl estradiol (NORDETTE) 0.15-0.03 mg per tablet, Take 1 tablet by mouth once daily., Disp: , Rfl:     tumeric-ging-olive-oreg-capryl 100 mg-150 mg- 50 mg-150 mg Cap, Take by mouth., Disp: , Rfl:     valACYclovir (VALTREX) 1000 MG tablet, as needed., Disp: , Rfl:     vitamin E 400 UNIT capsule, Take 800 Units by mouth once daily., Disp: , Rfl:     ALPRAZolam (XANAX) 0.5 MG tablet, Take 1 tablet (0.5 mg total) by mouth once as needed for Anxiety (take 1 hour prior to MRI, do not drive while taking this medication). (Patient not taking: Reported on 7/11/2024), Disp: 1 tablet, Rfl: 0    meloxicam (MOBIC) 15 MG tablet, Take 1 tablet (15 mg total) by mouth once daily. (Patient not taking: Reported on 7/11/2024), Disp: 30 tablet, Rfl: 0    pantoprazole (PROTONIX) 40 MG tablet, Take 1 tablet (40 mg total) by mouth once daily. (Patient taking differently: Take 40 mg by mouth daily as needed.), Disp: 30 tablet, Rfl: 11    sucralfate (CARAFATE) 1 gram tablet, TAKE ONE TABLET BY MOUTH 4 TIMES DAILY BEFORE MEALS AND nightly FOR 7 DAYS (Patient not taking: Reported on 7/11/2024), Disp: , Rfl:      Review of patient's allergies indicates:  No Known Allergies    Review of Systems:  All pertinent history in HPI.      Objective:     Vitals:  Vitals:    07/11/24 0802   BP: 109/68   Pulse: 68   Resp: 18   Temp: 98.1 °F (36.7 °C)   TempSrc: Oral   SpO2: 98%  "  Weight: 100.2 kg (221 lb)   Height: 5' 5" (1.651 m)         Body mass index is 36.78 kg/m².     Physical Exam:  General: The patient is awake, alert and oriented times three. The patient is well nourished and in no acute distress.  Neck: There is no evidence of palpable cervical, supraclavicular or axillary adenopathy. The neck is supple. The thyroid is not enlarged.  Musculoskeletal: The patient has a normal range of motion of her bilateral upper extremities.  Chest: Examination of the chest wall fails to reveal any obvious abnormalities.  The lungs are clear to auscultation bilaterally without rales, rhonchi, or wheezing.  Cardiovascular: The heart has a regular rate and rhythm without murmurs, gallops or rubs.  Breast:   Right:   Examination of right breast fails to reveal any dominant masses or areas of significant focal nodularity. The nipple is everted without evidence of discharge. There is no skin dimpling with movement of the pectoralis. There is no significant skin changes overlying the breast.   Left:   Examination of the left breast fails to reveal any dominant masses or areas of significant focal nodularity. The nipple is everted without evidence of discharge. There is no skin dimpling with movement of the pectoralis. There are no significant skin changes overlying the breast.  Abdomen: The abdomen is soft, flat, nontender and nondistended with no palpable masses or organomegaly.  Integumentary: no rashes or skin lesions present  Neurologic: cranial nerves intact, no signs of peripheral neurological deficit, motor/sensory function intact    Assessment:     Patient Active Problem List   Diagnosis    Abnormal uterine bleeding (AUB)        Edilma was seen today for follow-up.    Diagnoses and all orders for this visit:    At high risk for breast cancer  -     MRI Screening Breast W/WO Contrast, W/CAD, Edy; Future    Family history of breast cancer  -     MRI Screening Breast W/WO Contrast, W/CAD, Edy; " Future           Plan:       1. Lifestyle - Healthy lifestyle guidelines were reviewed. She was encouraged to engage in regular exercise, maintain a healthy body weight, and avoid excessive alcohol consumption. Healthy nutritional guidelines were also discussed. Self-breast examination was reviewed with the patient in detail and she was encouraged to perform this on a monthly basis.    2. Surveillance - She desires undergoing high risk screening with annual screening mammograms and breast MRIs. In the absence of significant clinical findings in the interval, I recommend breast MRI in April 2025 in screening mammogram in October 2025.  Of note, patient does have claustrophobia and will require Xanax in order to undergo breast MRI.      3. Follow up- RTC in 1 year.    4. Prevention - We had a brief discussion/education about indications for preventative mastectomy or chemoprevention.  She displayed mild interest in chemoprevention, but does not want to start at this time.    5. Genetics - Patient was found to have a pathogenic heterozygous mutation in MITF c.952G>A ([id number]).     6. Other routine screening exams:   -  Recommend annual follow up with PCP   -  Recommend annual follow up with GYN for pap smears/gynecologic exams and CBE      All of her questions were answered. She was advised to call if she develops any questions or concerns.    Anita Elliott PA-C      --------------------------------------------------------------------------------------------------------------    Total time on the date of the visit ranged from 30-39 mins (22225). Total time includes both face-to-face and non-face-to-face time personally spent by myself on the day of the visit.    Non-face-to-face time included:  _X_ preparing to see the patient such as reviewing the patient record  __ obtaining and reviewing separately obtained history  _X_ independently interpreting results  _X_ documenting clinical information in electronic health  record.    Face-to-face time included:  _X_ performing an appropriate history and examination  _X_ communicating results to the patient  _X_ counseling and educating the patient  __ ordering appropriate medications  __ ordering appropriate tests  _X_ ordering appropriate procedures (including follow-up)  _X_ answering any questions the patient had    Total Time spent on date of visit: 35 minutes

## 2024-07-11 ENCOUNTER — OFFICE VISIT (OUTPATIENT)
Dept: SURGERY | Facility: CLINIC | Age: 49
End: 2024-07-11
Payer: COMMERCIAL

## 2024-07-11 VITALS
BODY MASS INDEX: 36.82 KG/M2 | RESPIRATION RATE: 18 BRPM | OXYGEN SATURATION: 98 % | HEART RATE: 68 BPM | SYSTOLIC BLOOD PRESSURE: 109 MMHG | TEMPERATURE: 98 F | HEIGHT: 65 IN | WEIGHT: 221 LBS | DIASTOLIC BLOOD PRESSURE: 68 MMHG

## 2024-07-11 DIAGNOSIS — Z91.89 AT HIGH RISK FOR BREAST CANCER: Primary | ICD-10-CM

## 2024-07-11 DIAGNOSIS — Z80.3 FAMILY HISTORY OF BREAST CANCER: ICD-10-CM

## 2024-07-11 PROCEDURE — 3074F SYST BP LT 130 MM HG: CPT | Mod: CPTII,S$GLB,,

## 2024-07-11 PROCEDURE — 3008F BODY MASS INDEX DOCD: CPT | Mod: CPTII,S$GLB,,

## 2024-07-11 PROCEDURE — 1160F RVW MEDS BY RX/DR IN RCRD: CPT | Mod: CPTII,S$GLB,,

## 2024-07-11 PROCEDURE — 1159F MED LIST DOCD IN RCRD: CPT | Mod: CPTII,S$GLB,,

## 2024-07-11 PROCEDURE — 99214 OFFICE O/P EST MOD 30 MIN: CPT | Mod: S$GLB,,,

## 2024-07-11 PROCEDURE — 99999 PR PBB SHADOW E&M-EST. PATIENT-LVL V: CPT | Mod: PBBFAC,,,

## 2024-07-11 PROCEDURE — 3078F DIAST BP <80 MM HG: CPT | Mod: CPTII,S$GLB,,

## 2024-07-11 RX ORDER — VALACYCLOVIR HYDROCHLORIDE 1 G/1
TABLET, FILM COATED ORAL
COMMUNITY
Start: 2024-06-18

## 2024-10-23 ENCOUNTER — HOSPITAL ENCOUNTER (OUTPATIENT)
Dept: RADIOLOGY | Facility: HOSPITAL | Age: 49
Discharge: HOME | End: 2024-10-23
Payer: COMMERCIAL

## 2024-10-23 DIAGNOSIS — Z12.31 SCREENING MAMMOGRAM FOR BREAST CANCER: ICD-10-CM

## 2024-10-23 DIAGNOSIS — Z80.3 FAMILY HISTORY OF BREAST CANCER: ICD-10-CM

## 2024-10-23 DIAGNOSIS — Z91.89 AT HIGH RISK FOR BREAST CANCER: ICD-10-CM

## 2024-10-23 PROCEDURE — 77063 BREAST TOMOSYNTHESIS BI: CPT | Mod: TC

## 2024-10-23 PROCEDURE — 77063 BREAST TOMOSYNTHESIS BI: CPT | Mod: 26,,, | Performed by: RADIOLOGY

## 2024-10-23 PROCEDURE — 77067 SCR MAMMO BI INCL CAD: CPT | Mod: TC

## 2024-10-23 PROCEDURE — 77067 SCR MAMMO BI INCL CAD: CPT | Mod: 26,,, | Performed by: RADIOLOGY

## 2025-03-07 ENCOUNTER — LAB VISIT (OUTPATIENT)
Dept: LAB | Facility: HOSPITAL | Age: 50
End: 2025-03-07
Attending: OBSTETRICS & GYNECOLOGY
Payer: COMMERCIAL

## 2025-03-07 DIAGNOSIS — E55.9 AVITAMINOSIS D: ICD-10-CM

## 2025-03-07 DIAGNOSIS — E34.9 ENDOCRINE DISORDER RELATED TO PUBERTY: Primary | ICD-10-CM

## 2025-03-07 LAB
25(OH)D3+25(OH)D2 SERPL-MCNC: 36 NG/ML (ref 30–80)
ESTRADIOL SERPL HS-MCNC: 60 PG/ML
TESTOST SERPL-MCNC: 38 NG/DL (ref 12.4–35.75)

## 2025-03-07 PROCEDURE — 82306 VITAMIN D 25 HYDROXY: CPT

## 2025-03-07 PROCEDURE — 82670 ASSAY OF TOTAL ESTRADIOL: CPT

## 2025-03-07 PROCEDURE — 36415 COLL VENOUS BLD VENIPUNCTURE: CPT

## 2025-03-07 PROCEDURE — 84403 ASSAY OF TOTAL TESTOSTERONE: CPT

## 2025-04-11 ENCOUNTER — RESULTS FOLLOW-UP (OUTPATIENT)
Dept: SURGERY | Facility: CLINIC | Age: 50
End: 2025-04-11

## 2025-07-14 NOTE — PROGRESS NOTES
[facility] Breast Surg  Breast Surgical Oncology  New Patient Office Visit - H&P      Referring Provider: Anita Elliott  PCP: Nadir Salazar MD   Care Team:  OBGYN: No data on file.    Chief Complaint:   No chief complaint on file.       Subjective:     Interval:  07/14/2025 Patient here for annual high-risk follow-up today. She is doing well today.  She currently denies any breast complaints today.  Patient did undergo breast MRI in April which resulted as negative.  Patient states she is trying to live a healthier lifestyle, however she is not doing any daily exercise.  She is currently interested in a breast reduction, and she does have some questions about this today.  She is still taking oral estrogen for hormone replacement therapy due to menopausal symptoms.  She otherwise denies significant interval changes or changes in family history.  She has no new questions or concerns today.     HPI:  Edilma Mahoney is a 49 y.o. female who presents on 1/04/2024 for evaluation of risk for breast cancer. Based on the Tyrer-Cuzick Breast Cancer Risk Model, her lifetime risk is calculated to be 36.5%. 5 year Liliana score of 2.1%     Patient is doing well today. Patient underwent total hysterectomy with BSO in July of 2023.  She states since this time she has been experiencing bilateral intermittent breast pain.  She states that she experiences breast pain over entirety of both breast, but she states it is worse at her inframammary folds.  Patient states that she started taking vitamin-E supplement a couple of months ago, but she has not found any significant improvement in her breast pain yet.  She states the bras that she is wearing do have under wire, and that she probably needs to replace them as they are some years old. Patient states she does drink caffeine.  She currently denies any other breast issues including rashes, redness, swelling, nipple discharge, or new lumps/masses.   Patient states she does not perform self-breast exams.  Patient had screening mammogram done in 2023 which resulted in further imaging.  She had a bilateral diagnostic mammogram and ultrasound done in 2023 which resulted as benign.  Patient states she is currently going through menopause, as she had hysterectomy over 6 months ago.  She states she has been having symptoms such as hot flashes and mood swings. Due to this, she recently started taking estrogen.  Patient states she does not currently live a healthy lifestyle. She  plans to start eating healthier.  She has not able to exercise currently due to limitations after knee surgery, but she is going to physical therapy to help with this.  Patient does not smoke and she drinks alcohol in moderation. Patient has no other questions or concerns today.    Imaging:   10/19/2023 SC MG - Right breast 11:00 axis middle depth possible architectural distortion and left breast lateral region middle depth possible architectural distortion needs further evaluation. BI-RADS 0: Incomplete. Need additional imaging evaluation.   2023 [facility] DG  MG and US - No mammographic or sonographic evidence of malignancy. BIRADS: 2 Benign.   2024 Breast MRI - No MRI evidence of malignancy in either breast. BI-RADS: 1 Negative   10/24/2024 SC MG - There is no mammographic evidence of malignancy. BI-RADS: 1 Negative   2025 Breast MRI - No MR evidence of malignancy in either breast. BI-RADS: 1 Negative     Pathology:   None     OB/GYN History:  Age at Menarche Onset: 9  Menopausal Status: postmenopausal, LMP: No LMP recorded. Patient has had a hysterectomy.  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   Ashkenazi Adventism descent: No    Family History:  Family History   Problem Relation Name Age of Onset    Breast cancer Mother Fe 30    Arthritis Mother Fe     Rheum arthritis Mother Fe     Prostate cancer Father Lora 66    Arthritis Father Lora     Diabetes Father Lora     Hearing loss Father Lora     Hypertension Father Lora     Stomach cancer Maternal Uncle      Colon cancer Maternal Cousin  44    Colon cancer Paternal Uncle      Arthritis Maternal Grandmother Kaycee     COPD Brother Bradly         Patient History:  Past Medical History:   Diagnosis Date    Abnormal uterine bleeding     Total hysterectomy performed    Excessive, frequent and irregular menstruation     Hormone replacement therapy 2023    Post op hysterectomy    Menopause     Hysterectomy    Obesity     Pregnancy 2004       Past Surgical History:   Procedure Laterality Date    ABDOMINAL SURGERY       SECTION  2004    HYSTERECTOMY  2023    HYSTEROSCOPY      Uterine biopsy    KNEE ARTHROSCOPY W/ ACL RECONSTRUCTION Left     OOPHORECTOMY  2023    At time of hysterectomy    REPAIR OF MENISCUS OF KNEE Left     ROBOT-ASSISTED LAPAROSCOPIC ABDOMINAL HYSTERECTOMY USING DA YASMINE XI N/A 2023    Procedure: XI ROBOTIC HYSTERECTOMY;  Surgeon: Alexis Lynn MD;  Location: [facility];  Service: OB/GYN;  Laterality: N/A;  RALH / BSO       Social History     Socioeconomic History    Marital status:    Tobacco Use    Smoking status: Never    Smokeless tobacco: Never   Substance and Sexual Activity    Alcohol use: Yes     Alcohol/week: 4.0 standard drinks of alcohol     Types: 2 Glasses of wine, 2 Cans of beer per week     Comment: Drinks socially, not daily    Drug use: Never    Sexual activity: Yes     Partners: Male     Birth control/protection: Post-menopausal     Comment: Hysterectomy         There is no immunization history on file  for this patient.    Medications/Allergies:    Current Outpatient Medications:     ALPRAZolam (XANAX) 0.5 MG tablet, Take 1 tablet (0.5 mg total) by mouth once as needed for Anxiety (take 1 hour prior to MRI, do not drive while taking this medication). (Patient not taking: Reported on 7/11/2024), Disp: 1 tablet, Rfl: 0    collagen/biotin/ascorbic acid (COLLAGEN 1500 PLUS C ORAL), Take by mouth., Disp: , Rfl:     estradioL (ESTRACE) 1 MG tablet, Take 1 mg by mouth., Disp: , Rfl:     levonorgestrel-ethinyl estradiol (NORDETTE) 0.15-0.03 mg per tablet, Take 1 tablet by mouth once daily., Disp: , Rfl:     meloxicam (MOBIC) 15 MG tablet, Take 1 tablet (15 mg total) by mouth once daily. (Patient not taking: Reported on 7/11/2024), Disp: 30 tablet, Rfl: 0    pantoprazole (PROTONIX) 40 MG tablet, Take 1 tablet (40 mg total) by mouth once daily. (Patient taking differently: Take 40 mg by mouth daily as needed.), Disp: 30 tablet, Rfl: 11    sucralfate (CARAFATE) 1 gram tablet, TAKE ONE TABLET BY MOUTH 4 TIMES DAILY BEFORE MEALS AND nightly FOR 7 DAYS (Patient not taking: Reported on 7/11/2024), Disp: , Rfl:     tumeric-ging-olive-oreg-capryl 100 mg-150 mg- 50 mg-150 mg Cap, Take by mouth., Disp: , Rfl:     valACYclovir (VALTREX) 1000 MG tablet, as needed., Disp: , Rfl:     vitamin E 400 UNIT capsule, Take 800 Units by mouth once daily., Disp: , Rfl:      Review of patient's allergies indicates:  No Known Allergies    Review of Systems:  All pertinent history in HPI.      Objective:     Vitals:  There were no vitals filed for this visit.      There is no height or weight on file to calculate BMI.     Physical Exam:  General: The patient is awake, alert and oriented times three. The patient is well nourished and in no acute distress.  Neck: There is no evidence of palpable cervical, supraclavicular or axillary adenopathy. The neck is supple. The thyroid is not enlarged.  Musculoskeletal: The patient has a normal range of motion  of her bilateral upper extremities.  Chest: Examination of the chest wall fails to reveal any obvious abnormalities.  The lungs are clear to auscultation bilaterally without rales, rhonchi, or wheezing.  Cardiovascular: The heart has a regular rate and rhythm without murmurs, gallops or rubs.  Breast:   Right:   Examination of right breast fails to reveal any dominant masses or areas of significant focal nodularity. The nipple is everted without evidence of discharge. There is no skin dimpling with movement of the pectoralis. There is no significant skin changes overlying the breast.   Left:  Examination of the left breast fails to reveal any dominant masses or areas of significant focal nodularity. The nipple is everted without evidence of discharge. There is no skin dimpling with movement of the pectoralis. There are no significant skin changes overlying the breast.  Abdomen: The abdomen is soft, flat, nontender and nondistended with no palpable masses or organomegaly.  Integumentary: no rashes or skin lesions present  Neurologic: cranial nerves intact, no signs of peripheral neurological deficit, motor/sensory function intact    Assessment:     Patient Active Problem List   Diagnosis    Abnormal uterine bleeding (AUB)        There are no diagnoses linked to this encounter.     Hormone Replacement Therapy (HRT) -   Current research on HRT in individuals at a high risk of breast cancer shows a complex and evolving picture. A risk/beneift discussion is always important when deciding on HRT in high-risk patients and shared decision making between the patient and their care team. Lifestyle measures and non-hormonal interventions should be first-line management for estrogen deficiency symptoms but if these are ineffective systemic hormone replacement therapy or low-dose topical estrogen may be considered but only after taking specialist advice.    Guidelines and Recommendations:  NCCN and other major bodies suggest  avoiding HRT in high-risk women when possible, especially combined HRT. Risk-benefit discussions should be individualized, taking into account age, menopausal symptoms, mutation status, surgical history, and personal values.  The [place] Menopause Society (BMS), provides nuanced guidance on HRT for individuals at high risk of breast cancer, emphasizing personalized risk assessment and informed decision-making. The BMS emphasizes using the lowest effective dosage for the shortest duration necessary to manage symptoms in women at high risk for breast cancer (per consensus statement reviewed March 2025). The BMS also states there is no additive effect of HRT exposure in women at elevated personal risk due to a family history or high-risk benign breast condition. This is also echoed in the NICE guidance (NG23). Per BMS: If the use of HRT or vaginal oestrogen is considered, this should only be for the management of oestrogen deficiency symptoms after discussion with the womans breast specialist team.    Here's a summary of the current evidence:    High-Risk Non-BRCA Populations:  For individuals with a strong family history but no known mutation, HRT is generally discouraged - especially combination HRT - due to a modest but measurable increase in breat cancer risk, based on the data from WHI trial and follow up studies.  Estrogen-only HRT (in women without a uterus) may carry a lower risk and could be considered in some cases after individual risk assessment.  Combination HRT is shown to increase breast cancer risk, even when used for only a short time. Combination HRT also increases the likelihood that the cancer may be found at a more advanced stage, as well as increasing the risk that a woman diagnosed with breast cancer will die from the disease.    Considerations in Perimenopausal Women:  A retrospective cohort study using a [place] claims database ([id number]) analyzed perimenopausal women aged 45-54 years. The  "study found that breast cancer risk was lower in the HRT group compared to non-users, particularly in women who started HRT between ages 45-49. However, unopposed estrogen therapy was associated with uterine corpus cancer, reinforcing the need for combined therapy in women with a uterus.    Type and Duration of HRT:  There is no significant increased risk of breast cancer observed with the use of topical vaginal estrogen preparations.  Longer duration (>5 years) and combined estrogen-progestin formulations are consistently associated with increased risk.  Transdermal estrogen and micronized progesterone may carry a slightly lower risk profile but more data is needed.  Ovarian and Endometrial Cancer Risk -- Findings presented at the 2024 American Society of Clinical Oncology (ASCO) Annual Meeting highlighted differences in cancer risks based on the type of HRT:  Estrogen only therapy: increased risk of ovarian cancer incidence and mortality  Estrogen-progestin combination: no increased risk of ovarian cancer and a reduced risk of endometrial cancer    Other Considerations:  Mammogram changes: HRT is known to increased mammographic density, which may obscure small masses.  Monitoring and Follow-Up: Regular breast cancer screening and monitoring are advisable for women undergoing MHT, especially those on long-term therapy.  Bioidentical hormones: The higher breast cancer risk from using HRT is also found in "bioidentical" and "natural" hormones, as it is for synthetic hormones. "Bioidentical" means the hormones in the product are identical to the hormones your body produces. Bioidentical hormones are said to be "natural" -- derived from plants. Synthetic hormones are made in a lab and are also chemically identical to the hormones in your body. It's important to know that many herbal and bioidentical HRT products fall outside the jurisdiction of the [place] Food and Drug Administration and so aren't subject to the " same regulations and testing that medications are.      Plan:       1. Lifestyle - Healthy lifestyle guidelines were reviewed. She was encouraged to engage in regular exercise, maintain a healthy body weight, and avoid excessive alcohol consumption. Healthy nutritional guidelines were also discussed. Self-breast examination was reviewed with the patient in detail and she was encouraged to perform this on a monthly basis.    2. Surveillance - She desires undergoing high risk screening with annual screening mammograms and breast MRIs. In the absence of significant clinical findings in the interval, I recommend screening mammogram in October 2025 and breast MRI in April 2026.  Of note, patient does have claustrophobia and will require antianxiety medication in order to undergo breast MRI.  She was advised to call 1 week before her breast MRI for medication be sent to her pharmacy.    3. Follow up- RTC in 1 year.    4. Prevention - We had a brief discussion/education about indications for preventative mastectomy or chemoprevention.  She displayed mild interest in chemoprevention, but does not want to start at this time.    5. Genetics - Patient was found to have a pathogenic heterozygous mutation in MITF c.952G>A ([id number]).     6. HRT - Discussed above risks/benefits with patient. We have previously discussed nonhormonal alternatives to help with menopausal symptoms.  She is not interested in stopping HRT at this time.  Will continue to closely monitor patient.    7. Other routine screening exams:   -  Recommend annual follow up with PCP   -  Recommend annual follow up with GYN for pap smears/gynecologic exams and CBE      All of her questions were answered. She was advised to call if she develops any questions or concerns.    Anita Elliott PA-C      --------------------------------------------------------------------------------------------------------------    Total time on the date of the visit ranged from 30-39  mins (27710). Total time includes both face-to-face and non-face-to-face time personally spent by myself on the day of the visit.    Non-face-to-face time included:  _X_ preparing to see the patient such as reviewing the patient record  __ obtaining and reviewing separately obtained history  _X_ independently interpreting results  _X_ documenting clinical information in electronic health record.    Face-to-face time included:  _X_ performing an appropriate history and examination  _X_ communicating results to the patient  _X_ counseling and educating the patient  __ ordering appropriate medications  __ ordering appropriate tests  _X_ ordering appropriate procedures (including follow-up)  _X_ answering any questions the patient had    Total Time spent on date of visit: 35 minutes

## 2025-07-15 ENCOUNTER — OFFICE VISIT (OUTPATIENT)
Dept: SURGERY | Facility: CLINIC | Age: 50
End: 2025-07-15
Payer: COMMERCIAL

## 2025-07-15 VITALS
SYSTOLIC BLOOD PRESSURE: 131 MMHG | WEIGHT: 219 LBS | HEIGHT: 65 IN | OXYGEN SATURATION: 97 % | RESPIRATION RATE: 18 BRPM | DIASTOLIC BLOOD PRESSURE: 79 MMHG | HEART RATE: 77 BPM | TEMPERATURE: 98 F | BODY MASS INDEX: 36.49 KG/M2

## 2025-07-15 DIAGNOSIS — Z12.31 SCREENING MAMMOGRAM FOR BREAST CANCER: ICD-10-CM

## 2025-07-15 DIAGNOSIS — Z80.3 FAMILY HISTORY OF BREAST CANCER: ICD-10-CM

## 2025-07-15 DIAGNOSIS — Z91.89 AT HIGH RISK FOR BREAST CANCER: Primary | ICD-10-CM

## 2025-07-15 PROCEDURE — 3008F BODY MASS INDEX DOCD: CPT | Mod: CPTII,S$GLB,,

## 2025-07-15 PROCEDURE — 99214 OFFICE O/P EST MOD 30 MIN: CPT | Mod: S$GLB,,,

## 2025-07-15 PROCEDURE — 3075F SYST BP GE 130 - 139MM HG: CPT | Mod: CPTII,S$GLB,,

## 2025-07-15 PROCEDURE — 1160F RVW MEDS BY RX/DR IN RCRD: CPT | Mod: CPTII,S$GLB,,

## 2025-07-15 PROCEDURE — 99999 PR PBB SHADOW E&M-EST. PATIENT-LVL IV: CPT | Mod: PBBFAC,,,

## 2025-07-15 PROCEDURE — 1159F MED LIST DOCD IN RCRD: CPT | Mod: CPTII,S$GLB,,

## 2025-07-15 PROCEDURE — 3078F DIAST BP <80 MM HG: CPT | Mod: CPTII,S$GLB,,

## 2025-07-15 RX ORDER — ESTRADIOL 1 MG/1
1 TABLET ORAL NIGHTLY
COMMUNITY
Start: 2024-10-18

## (undated) DEVICE — SET TRI-LUMEN FILTERED TUBE

## (undated) DEVICE — SEALER VESSEL EXTEND

## (undated) DEVICE — SEAL UNIVERSAL 5MM-8MM XI

## (undated) DEVICE — FORCEP MARYLAND BIOPOLAR XI

## (undated) DEVICE — CLOSURE SKIN STERI STRIP 1/2X4

## (undated) DEVICE — GOWN POLY REINF X-LONG 2XL

## (undated) DEVICE — ADHESIVE DERMABOND ADVANCED

## (undated) DEVICE — GLOVE PROTEXIS HYDROGEL SZ6.5

## (undated) DEVICE — NDL INSUF ULTRA VERESS 120MM

## (undated) DEVICE — KIT SURGICAL TURNOVER

## (undated) DEVICE — PAD SANITARY HVY ABSRB UNSCNTD

## (undated) DEVICE — LEGGING SURG CONV 43X28IN

## (undated) DEVICE — SOL NORMAL USPCA 0.9%

## (undated) DEVICE — TIP RUMI BLUE DISPOSABLE 5/BX

## (undated) DEVICE — CARTRIDGE BABCOCK GRASPER 5X45

## (undated) DEVICE — SUT 2-0 CT-1 SPIRAL 12IN

## (undated) DEVICE — PAD PINK TRENDELENBURG POS XL

## (undated) DEVICE — COVER MAYO STAND REINFRCD 30

## (undated) DEVICE — SYR DISP LL 5CC

## (undated) DEVICE — SUT ABS CLIP LAPRA-TY CTD

## (undated) DEVICE — COVER TIP CURVED SCISSORS XI

## (undated) DEVICE — DRAPE COLUMN DAVINCI XI

## (undated) DEVICE — GLOVE PROTEXIS HYDROGEL SZ7

## (undated) DEVICE — ELECTRODE PATIENT RETURN DISP

## (undated) DEVICE — OBTURATOR BLADELESS 8MM XI CLR

## (undated) DEVICE — IRRIGATOR SUCTION W/TIP

## (undated) DEVICE — PACK GYN LAPAROSCOPY HZD

## (undated) DEVICE — PORT ACCESS 8MM W/120MM LOW

## (undated) DEVICE — TRAY SKIN SCRUB WET PREMIUM

## (undated) DEVICE — TIP RUMI KOH-EFFICIENT 3.0

## (undated) DEVICE — GLOVE PROTEXIS PI SYN SURG 8.0

## (undated) DEVICE — SUT PROLENE 0 CT-2 BL MONO

## (undated) DEVICE — SPONGE LAP STRL 18X18IN

## (undated) DEVICE — APPLICATOR CHLORAPREP ORN 26ML

## (undated) DEVICE — TRAY CATH FOL SIL URIMTR 16FR

## (undated) DEVICE — SOL CLEARIFY VISUALIZATION LAP

## (undated) DEVICE — SUT MCRYL PLUS 4-0 PS2 27IN

## (undated) DEVICE — GLOVE PROTEXIS HYDROGEL SZ7.5

## (undated) DEVICE — DRAPE ARM DAVINCI XI

## (undated) DEVICE — COVER TABLE HVY DTY 60X90IN